# Patient Record
Sex: FEMALE | Race: WHITE | NOT HISPANIC OR LATINO | Employment: FULL TIME | URBAN - METROPOLITAN AREA
[De-identification: names, ages, dates, MRNs, and addresses within clinical notes are randomized per-mention and may not be internally consistent; named-entity substitution may affect disease eponyms.]

---

## 2017-01-12 DIAGNOSIS — M54.2 NECK PAIN: ICD-10-CM

## 2017-01-12 DIAGNOSIS — M50.20 PROTRUDED CERVICAL DISC: Primary | ICD-10-CM

## 2017-01-12 NOTE — TELEPHONE ENCOUNTER
Pt got TENS unit at [facility] yesterday.  They told her she will need to replace pads periodically and needed a Rx for this.  T'd up, please print and fax over.  Thank you.

## 2017-03-09 ENCOUNTER — TRANSFERRED RECORDS (OUTPATIENT)
Dept: HEALTH INFORMATION MANAGEMENT | Facility: CLINIC | Age: 37
End: 2017-03-09

## 2017-04-17 ENCOUNTER — TELEPHONE (OUTPATIENT)
Dept: INTERNAL MEDICINE | Facility: CLINIC | Age: 37
End: 2017-04-17

## 2017-04-17 NOTE — TELEPHONE ENCOUNTER
Panel Management Review      Patient has the following on her problem list:     Depression / Dysthymia review  PHQ-9 SCORE 2/18/2015 4/15/2015 12/28/2016   Total Score 9 3 -   Total Score - - 23      Patient is due for:  Physical, ACT    Asthma review     ACT Total Scores 2/18/2015   ACT TOTAL SCORE 23   ASTHMA ER VISITS 0 = None   ASTHMA HOSPITALIZATIONS 0 = None      1. Is Asthma diagnosis on the Problem List? Yes    2. Is Asthma listed on Health Maintenance? Yes    3. Patient is due for:  ACT and physical      Composite cancer screening  Chart review shows that this patient is due/due soon for the following Pap Smear and ACT  Summary:    Patient is due/failing the following:   ACT, PAP and PHYSICAL    Action needed:   Patient needs office visit for physical.    Type of outreach:    Phone, left message for patient to call back.     Questions for provider review:    None                                                                                                                                    Nancie Herrera MA       Chart routed to Care Team .

## 2017-09-12 ENCOUNTER — TELEPHONE (OUTPATIENT)
Dept: INTERNAL MEDICINE | Facility: CLINIC | Age: 37
End: 2017-09-12

## 2017-09-12 NOTE — TELEPHONE ENCOUNTER
Panel Management Review      Patient has the following on her problem list:     Depression / Dysthymia review  PHQ-9 SCORE 2/18/2015 4/15/2015 12/28/2016   Total Score 9 3 -   Total Score - - 23      Patient is due for:  PHQ9 and DAP    Asthma review     ACT Total Scores 2/18/2015   ACT TOTAL SCORE 23   ASTHMA ER VISITS 0 = None   ASTHMA HOSPITALIZATIONS 0 = None      1. Is Asthma diagnosis on the Problem List? Yes    2. Is Asthma listed on Health Maintenance? Yes    3. Patient is due for:  ACT and AAP          Composite cancer screening  Chart review shows that this patient is due/due soon for the following Pap Smear  Summary:    Patient is due/failing the following:   AAP, ACT, DAP, PAP and PHQ9    Action needed:   Patient needs office visit for asthma/depression.    Type of outreach:    Sent [employer] message.    Questions for provider review:    None                                                                                                                                    Gissel Patel CMA       Chart routed to no one .

## 2017-11-19 ENCOUNTER — HEALTH MAINTENANCE LETTER (OUTPATIENT)
Age: 37
End: 2017-11-19

## 2017-11-29 ENCOUNTER — TELEPHONE (OUTPATIENT)
Dept: INTERNAL MEDICINE | Facility: CLINIC | Age: 37
End: 2017-11-29

## 2017-11-29 NOTE — TELEPHONE ENCOUNTER
"11/29/2017      Patient Communication Preferences indicate  Do not contact  and/or communication by \"Phone\" is not preferred. Call not required per Outreach team.        Outreach ,  Molly Mccoy        "

## 2018-01-12 ENCOUNTER — TELEPHONE (OUTPATIENT)
Dept: INTERNAL MEDICINE | Facility: CLINIC | Age: 38
End: 2018-01-12

## 2018-01-12 NOTE — TELEPHONE ENCOUNTER
Form received from: [employer]    Form requesting following info/need: TENS supply orders    CARMELINA needed?: No    Location of form: Dr. Magana's in basket    When completed the route for return: Fax

## 2019-06-27 ENCOUNTER — APPOINTMENT (OUTPATIENT)
Dept: GENERAL RADIOLOGY | Facility: CLINIC | Age: 39
End: 2019-06-27
Attending: EMERGENCY MEDICINE
Payer: COMMERCIAL

## 2019-06-27 ENCOUNTER — HOSPITAL ENCOUNTER (EMERGENCY)
Facility: CLINIC | Age: 39
Discharge: HOME | End: 2019-06-27
Attending: EMERGENCY MEDICINE | Admitting: EMERGENCY MEDICINE
Payer: COMMERCIAL

## 2019-06-27 VITALS
DIASTOLIC BLOOD PRESSURE: 84 MMHG | SYSTOLIC BLOOD PRESSURE: 145 MMHG | OXYGEN SATURATION: 98 % | HEART RATE: 91 BPM | RESPIRATION RATE: 16 BRPM | TEMPERATURE: 98 F | WEIGHT: 230 LBS | BODY MASS INDEX: 42.07 KG/M2

## 2019-06-27 DIAGNOSIS — J40 BRONCHITIS: ICD-10-CM

## 2019-06-27 DIAGNOSIS — R11.2 NON-INTRACTABLE VOMITING WITH NAUSEA, UNSPECIFIED VOMITING TYPE: ICD-10-CM

## 2019-06-27 LAB
DEPRECATED S PYO AG THROAT QL EIA: NORMAL
SPECIMEN SOURCE: NORMAL

## 2019-06-27 PROCEDURE — 25000125 ZZHC RX 250: Performed by: EMERGENCY MEDICINE

## 2019-06-27 PROCEDURE — 87880 STREP A ASSAY W/OPTIC: CPT | Performed by: EMERGENCY MEDICINE

## 2019-06-27 PROCEDURE — 25000131 ZZH RX MED GY IP 250 OP 636 PS 637: Performed by: EMERGENCY MEDICINE

## 2019-06-27 PROCEDURE — 71046 X-RAY EXAM CHEST 2 VIEWS: CPT

## 2019-06-27 PROCEDURE — 96372 THER/PROPH/DIAG INJ SC/IM: CPT

## 2019-06-27 PROCEDURE — 25000128 H RX IP 250 OP 636: Performed by: EMERGENCY MEDICINE

## 2019-06-27 PROCEDURE — 94640 AIRWAY INHALATION TREATMENT: CPT

## 2019-06-27 PROCEDURE — 87081 CULTURE SCREEN ONLY: CPT | Performed by: EMERGENCY MEDICINE

## 2019-06-27 PROCEDURE — 99284 EMERGENCY DEPT VISIT MOD MDM: CPT | Mod: 25

## 2019-06-27 RX ORDER — ALBUTEROL SULFATE 90 UG/1
2 AEROSOL, METERED RESPIRATORY (INHALATION) EVERY 6 HOURS PRN
Qty: 1 INHALER | Refills: 0 | Status: SHIPPED | OUTPATIENT
Start: 2019-06-27 | End: 2020-01-10

## 2019-06-27 RX ORDER — ONDANSETRON 4 MG/1
4 TABLET, ORALLY DISINTEGRATING ORAL EVERY 8 HOURS PRN
Qty: 10 TABLET | Refills: 0 | Status: SHIPPED | OUTPATIENT
Start: 2019-06-27 | End: 2020-01-10

## 2019-06-27 RX ORDER — IPRATROPIUM BROMIDE AND ALBUTEROL SULFATE 2.5; .5 MG/3ML; MG/3ML
3 SOLUTION RESPIRATORY (INHALATION) ONCE
Status: COMPLETED | OUTPATIENT
Start: 2019-06-27 | End: 2019-06-27

## 2019-06-27 RX ORDER — ONDANSETRON 4 MG/1
4 TABLET, ORALLY DISINTEGRATING ORAL ONCE
Status: COMPLETED | OUTPATIENT
Start: 2019-06-27 | End: 2019-06-27

## 2019-06-27 RX ORDER — IPRATROPIUM BROMIDE AND ALBUTEROL SULFATE 2.5; .5 MG/3ML; MG/3ML
1 SOLUTION RESPIRATORY (INHALATION) EVERY 4 HOURS PRN
Qty: 1 BOX | Refills: 1 | Status: SHIPPED | OUTPATIENT
Start: 2019-06-27 | End: 2020-01-10

## 2019-06-27 RX ORDER — PREDNISONE 50 MG/1
TABLET ORAL
Qty: 4 TABLET | Refills: 0 | Status: SHIPPED | OUTPATIENT
Start: 2019-06-27 | End: 2020-01-10

## 2019-06-27 RX ORDER — KETOROLAC TROMETHAMINE 15 MG/ML
15 INJECTION, SOLUTION INTRAMUSCULAR; INTRAVENOUS ONCE
Status: COMPLETED | OUTPATIENT
Start: 2019-06-27 | End: 2019-06-27

## 2019-06-27 RX ADMIN — IPRATROPIUM BROMIDE AND ALBUTEROL SULFATE 3 ML: .5; 3 SOLUTION RESPIRATORY (INHALATION) at 05:57

## 2019-06-27 RX ADMIN — ONDANSETRON 4 MG: 4 TABLET, ORALLY DISINTEGRATING ORAL at 07:04

## 2019-06-27 RX ADMIN — KETOROLAC TROMETHAMINE 15 MG: 15 INJECTION, SOLUTION INTRAMUSCULAR; INTRAVENOUS at 07:06

## 2019-06-27 RX ADMIN — IPRATROPIUM BROMIDE AND ALBUTEROL SULFATE 3 ML: .5; 3 SOLUTION RESPIRATORY (INHALATION) at 07:04

## 2019-06-27 ASSESSMENT — ENCOUNTER SYMPTOMS
FEVER: 0
EYE DISCHARGE: 0
EYE REDNESS: 0
EYE ITCHING: 1
CHEST TIGHTNESS: 1
SORE THROAT: 1
VOMITING: 1
BACK PAIN: 1
COUGH: 1

## 2019-06-27 NOTE — DISCHARGE INSTRUCTIONS
Steroids for bronchitis which is likely making your underlying asthma flare.  Nebulizers or your inhaler every 4-6 hours as needed for cough, chest tightness, or shortness of breath.  Zofran as needed for nausea and vomiting.  Tylenol or ibuprofen as needed for pain.  Follow-up with your primary care provider on Monday for reevaluation.  In the meantime, return to the emergency department if you have severe chest pain or shortness of breath or if you develop any new or concerning symptoms including, but not limited to, fever greater than 101  F.

## 2019-06-27 NOTE — ED TRIAGE NOTES
Productive cough for last 2 days, much worse this AM.  Productive of yellow sputum.  Mild nausea as well.

## 2019-06-27 NOTE — ED PROVIDER NOTES
History     Chief Complaint:  Cough    The history is provided by the patient.      Lidya Montelongo is a 38 year old female who does not smoke, with a history of asthma, who presents for evaluation of a cough. Four days ago, she started coughing which she initially attributed to her asthma and allergies despite lack of sneezing or watery eyes. She has been using Sudafed, her usual daily Zyrtec, and nebulizers. Despite these interventions, her cough has progressed and is productive of yellow sputum. Last night her cough prohibited her from sleeping. She has associated chest tightness that wraps around to her back and she notes that she has had similar back pain in the past when she was diagnosed with strep throat. She has been taking Tylenol and ibuprofen for her symptoms, last dose 3 hours prior to arrival. She has had post-tussive emesis, congestion, itchy eyes, and bilateral otalgia - right worse than left. She has a slight sore throat, but feels this is likely secondary to cough. She is unsure if she has a fever or not.     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    Allergies:  Latex  Amoxicillin  Magnesium     Medications:    Albuterol inhaler/nebulizer  Cetirizine  Celexa  Propranolol  Ranitidine  Ropinirole    Past Medical History:    Asthma  Migraines  RLS  Depression  ADHD    Past Surgical History:     x2  Tubal ligation    Family History:    HTN  Diabetes    Social History:  Marital Status:     Presents with her mother and her children.  Negative for tobacco use.  Negative for alcohol use.   .    Review of Systems   Constitutional: Negative for fever.   HENT: Positive for congestion, ear pain and sore throat. Negative for sneezing.    Eyes: Positive for itching. Negative for discharge and redness.   Respiratory: Positive for cough and chest tightness.    Gastrointestinal: Positive for vomiting.   Musculoskeletal: Positive for back pain.    Psychiatric/Behavioral: Positive for sleep disturbance.   All other systems reviewed and are negative.    Physical Exam     Patient Vitals for the past 24 hrs:   BP Temp Temp src Pulse Resp SpO2 Weight   06/27/19 0807 145/84 98  F (36.7  C) Oral 91 16 98 % --   06/27/19 0538 (!) 162/111 -- -- -- -- -- --   06/27/19 0537 -- 98.1  F (36.7  C) Temporal 95 22 95 % 104.3 kg (230 lb)      Physical Exam  General: Well-developed and well-nourished. Well appearing young  woman. Cooperative.  Head:  Atraumatic.  Eyes:  Conjunctivae, lids, and sclerae are normal.  ENT:    Normal nose. Moist mucous membranes.  Normal TMs bilaterally.  Clear posterior oropharynx.  Neck:  Supple. Normal range of motion.  No significant lymphadenopathy.  CV:  Regular rate and rhythm. Normal heart sounds with no murmurs, rubs, or gallops detected.  Resp:  No respiratory distress. Clear to auscultation bilaterally without decreased breath sounds, wheezing, rales, or rhonchi. Frequent cough.  GI:  Soft. Non-distended. Non-tender.    MS:  Normal ROM.   Skin:  Warm. Non-diaphoretic. No pallor.  Neuro:  Awake. A&Ox3. Normal strength.  Psych: Normal mood and affect. Normal speech.  Vitals reviewed.    Emergency Department Course   Imaging:  Radiographic findings were communicated with the patient who voiced understanding of the findings.  XR Chest PA & LAT:   No evidence of active cardiopulmonary disease, as per radiology.     Laboratory:  Rapid strep: Negative  Beta strep group A culture: Pending    Interventions:  0557 Albuterol-Ipratropium 2.5mg-0.5mg/3ml, 3 mL solution, Nebulizer   0704 Albuterol-Ipratropium 2.5mg-0.5mg/3ml, 3 mL solution, Nebulizer     Zofran-ODT 4mg disintegrating tablet PO  0706 Toradol, 15 mg, IM injection    Emergency Department Course:  Nursing notes and vitals reviewed. Dr. Shen started a preliminary work up on the patient in the emergency department.      Medicine administered as documented above.      The  patient was sent for a chest x-ray while in the emergency department, findings above.      (0650) I performed an exam of the patient as documented above. She reports the first DuoNeb improved her symptoms.     Throat swabbed and sample was sent to the laboratory for testing, findings above.     (0741) I rechecked the patient and discussed the results of her workup thus far. Says nebs are helping her breathing and the Toradol helped her pains. She states she has been using home nebulizer treatments, but she has run out of them.      (0810) The patient was able to tolerate a PO challenge in the emergency department without return of symptoms.     (0813) I rechecked the patient and provided discharge instructions. At this point, she checked her albuterol inhaler and noted it to be .     Findings and plan explained to the patient. Patient discharged home with instructions regarding supportive care, medications, and reasons to return. The importance of close follow-up was reviewed. The patient was prescribed a DuoNeb solution, a short course of prednisone, and Zofran ODT tablets. I also refilled her albuterol inhaler.      I personally reviewed the laboratory and imaging results with the patient and answered all related questions prior to discharge.     Impression & Plan      Medical Decision Making:  Lidya is a 38-year-old female who has had 4 days of worsening cough, productive of yellow sputum, associated with some chest tightness and post-tussive vomiting.  She does have some pain radiating towards her back which she states is similar to when she has had strep throat in the past.  She denies junior fever. Fortunately, Lidya appears quite well on exam.  She is coughing frequently and does produce yellow sputum, though her lungs are clear bilaterally with no wheezing.  Despite otalgia and concerns for sore throat, these areas are normal on exam.  Patient was given a DuoNeb and had improvement and then a second  DuoNeb and had further improvement with decreased frequency of cough.  I did obtain a rapid strep which is negative as expected as she has no significant sore throat or fever.  Chest x-ray was obtained which reveals no pneumonia or other acute pathology to explain her symptoms.  She was given IM Toradol with improvement in her pain and Zofran with resolution of her nausea and vomiting such that she could tolerate PO.  Symptoms are most consistent with bronchitis likely causing some exacerbation of her underlying asthma though, again, she had no wheezing.  At this time, there is no evidence of bacterial infection and antibiotics are not indicated.  I will give her a short burst of steroids for asthma exacerbation/bronchitis and I have refilled her home nebulizer as she was out and her inhaler which she discovered while in the emergency department is .  She will continue Zofran as needed for posttussive vomiting.  She will also continue Tylenol or ibuprofen for her aches and pains and she agrees to follow-up with her primary care provider in the next few days for repeat evaluation.  At this time, further work-up or treatment is not indicated in the emergency department.  Labs are unlikely to .  I highly doubt pulmonary embolus as the etiology of cough and chest tightness in this young otherwise healthy woman, particularly with infectious symptoms.  However, patient understands she should return to the emergency department should she have worsening of her current symptoms or development of new concerns.  All of the patient and her family's questions were answered and they verbalized understanding.  Amenable to discharge.    Diagnosis:    ICD-10-CM    1. Bronchitis J40    2. Non-intractable vomiting with nausea, unspecified vomiting type R11.2        Disposition:  discharged home    Discharge Medications:     Medication List      Started    ipratropium - albuterol 0.5 mg/2.5 mg/3 mL 0.5-2.5 (3)  MG/3ML neb solution  Commonly known as:  DUONEB  1 vial, Nebulization, EVERY 4 HOURS PRN     ondansetron 4 MG ODT tab  Commonly known as:  ZOFRAN ODT  4 mg, Oral, EVERY 8 HOURS PRN     predniSONE 50 MG tablet  Commonly known as:  DELTASONE  Take 1 tablet by mouth daily for 4 days.     * albuterol 108 (90 Base) MCG/ACT inhaler  Commonly known as:  PROAIR HFA/PROVENTIL HFA/VENTOLIN HFA  2 puffs, Inhalation, EVERY 6 HOURS PRN  What changed:  You were already taking a medication with the same name, and this prescription was added. Make sure you understand how and when to take each.          Scribe Disclosure:  I, Cristina Perez, am serving as a scribe on 6/27/2019 at 6:50 AM to personally document services performed by Claudia Pastor MD based on my observations and the provider's statements to me.        Cristina Perez  6/27/2019   [facility] EMERGENCY DEPARTMENT       Claudia Pastor MD  06/30/19 2005

## 2019-06-27 NOTE — ED AVS SNAPSHOT
[facility] Emergency Department  [address]  [facility] [address]  Phone:  [phone]  Fax:  [phone]                                    Lidya Montelongo   MRN: [id number]    Department:  [facility] Emergency Department   Date of Visit:  6/27/2019           After Visit Summary Signature Page    I have received my discharge instructions, and my questions have been answered. I have discussed any challenges I see with this plan with the nurse or doctor.    ..........................................................................................................................................  Patient/Patient Representative Signature      ..........................................................................................................................................  Patient Representative Print Name and Relationship to Patient    ..................................................               ................................................  Date                                   Time    ..........................................................................................................................................  Reviewed by Signature/Title    ...................................................              ..............................................  Date                                               Time          22EPIC Rev 08/18

## 2019-06-29 LAB
BACTERIA SPEC CULT: NORMAL
Lab: NORMAL
SPECIMEN SOURCE: NORMAL

## 2019-06-30 ASSESSMENT — ENCOUNTER SYMPTOMS: SLEEP DISTURBANCE: 1

## 2020-01-10 ENCOUNTER — OFFICE VISIT (OUTPATIENT)
Dept: INTERNAL MEDICINE | Facility: CLINIC | Age: 40
End: 2020-01-10
Payer: COMMERCIAL

## 2020-01-10 ENCOUNTER — TELEPHONE (OUTPATIENT)
Dept: INTERNAL MEDICINE | Facility: CLINIC | Age: 40
End: 2020-01-10

## 2020-01-10 VITALS
OXYGEN SATURATION: 99 % | HEIGHT: 62 IN | DIASTOLIC BLOOD PRESSURE: 90 MMHG | RESPIRATION RATE: 16 BRPM | WEIGHT: 243 LBS | HEART RATE: 93 BPM | TEMPERATURE: 97.8 F | SYSTOLIC BLOOD PRESSURE: 140 MMHG | BODY MASS INDEX: 44.72 KG/M2

## 2020-01-10 DIAGNOSIS — G25.81 RESTLESS LEG SYNDROME: ICD-10-CM

## 2020-01-10 DIAGNOSIS — J31.0 CHRONIC RHINITIS: ICD-10-CM

## 2020-01-10 DIAGNOSIS — E66.01 MORBID OBESITY (H): ICD-10-CM

## 2020-01-10 DIAGNOSIS — M62.838 NECK MUSCLE SPASM: ICD-10-CM

## 2020-01-10 DIAGNOSIS — F41.8 DEPRESSION WITH ANXIETY: ICD-10-CM

## 2020-01-10 DIAGNOSIS — B37.2 YEAST INFECTION OF THE SKIN: ICD-10-CM

## 2020-01-10 DIAGNOSIS — M54.2 CERVICALGIA: ICD-10-CM

## 2020-01-10 DIAGNOSIS — F32.0 MILD MAJOR DEPRESSION (H): Primary | ICD-10-CM

## 2020-01-10 DIAGNOSIS — J45.20 MILD INTERMITTENT ASTHMA WITHOUT COMPLICATION: ICD-10-CM

## 2020-01-10 DIAGNOSIS — Z00.00 ROUTINE GENERAL MEDICAL EXAMINATION AT A HEALTH CARE FACILITY: ICD-10-CM

## 2020-01-10 DIAGNOSIS — G43.109 MIGRAINE WITH AURA AND WITHOUT STATUS MIGRAINOSUS, NOT INTRACTABLE: ICD-10-CM

## 2020-01-10 LAB
BASOPHILS # BLD AUTO: 0 10E9/L (ref 0–0.2)
BASOPHILS NFR BLD AUTO: 0.4 %
DIFFERENTIAL METHOD BLD: NORMAL
EOSINOPHIL # BLD AUTO: 0.2 10E9/L (ref 0–0.7)
EOSINOPHIL NFR BLD AUTO: 2.7 %
ERYTHROCYTE [DISTWIDTH] IN BLOOD BY AUTOMATED COUNT: 12.8 % (ref 10–15)
HCT VFR BLD AUTO: 39.7 % (ref 35–47)
HGB BLD-MCNC: 13 G/DL (ref 11.7–15.7)
IMM GRANULOCYTES # BLD: 0 10E9/L (ref 0–0.4)
IMM GRANULOCYTES NFR BLD: 0.3 %
LYMPHOCYTES # BLD AUTO: 2 10E9/L (ref 0.8–5.3)
LYMPHOCYTES NFR BLD AUTO: 29.2 %
MCH RBC QN AUTO: 28.5 PG (ref 26.5–33)
MCHC RBC AUTO-ENTMCNC: 32.7 G/DL (ref 31.5–36.5)
MCV RBC AUTO: 87 FL (ref 78–100)
MONOCYTES # BLD AUTO: 0.4 10E9/L (ref 0–1.3)
MONOCYTES NFR BLD AUTO: 5.8 %
NEUTROPHILS # BLD AUTO: 4.1 10E9/L (ref 1.6–8.3)
NEUTROPHILS NFR BLD AUTO: 61.6 %
NRBC # BLD AUTO: 0 10*3/UL
NRBC BLD AUTO-RTO: 0 /100
PLATELET # BLD AUTO: 323 10E9/L (ref 150–450)
RBC # BLD AUTO: 4.56 10E12/L (ref 3.8–5.2)
WBC # BLD AUTO: 6.7 10E9/L (ref 4–11)

## 2020-01-10 PROCEDURE — 80061 LIPID PANEL: CPT | Performed by: INTERNAL MEDICINE

## 2020-01-10 PROCEDURE — 96127 BRIEF EMOTIONAL/BEHAV ASSMT: CPT | Performed by: INTERNAL MEDICINE

## 2020-01-10 PROCEDURE — 84439 ASSAY OF FREE THYROXINE: CPT | Performed by: INTERNAL MEDICINE

## 2020-01-10 PROCEDURE — 80050 GENERAL HEALTH PANEL: CPT | Performed by: INTERNAL MEDICINE

## 2020-01-10 PROCEDURE — 99203 OFFICE O/P NEW LOW 30 MIN: CPT | Performed by: INTERNAL MEDICINE

## 2020-01-10 PROCEDURE — 36415 COLL VENOUS BLD VENIPUNCTURE: CPT | Performed by: INTERNAL MEDICINE

## 2020-01-10 PROCEDURE — 82306 VITAMIN D 25 HYDROXY: CPT | Performed by: INTERNAL MEDICINE

## 2020-01-10 RX ORDER — SUMATRIPTAN 100 MG/1
100 TABLET, FILM COATED ORAL
Qty: 9 TABLET | Refills: 7 | Status: SHIPPED | OUTPATIENT
Start: 2020-01-10 | End: 2021-10-12

## 2020-01-10 RX ORDER — IPRATROPIUM BROMIDE AND ALBUTEROL SULFATE 2.5; .5 MG/3ML; MG/3ML
1 SOLUTION RESPIRATORY (INHALATION) EVERY 4 HOURS PRN
Qty: 1 BOX | Refills: 1 | Status: SHIPPED | OUTPATIENT
Start: 2020-01-10 | End: 2021-10-12

## 2020-01-10 RX ORDER — METHOCARBAMOL 500 MG/1
1000 TABLET, FILM COATED ORAL 3 TIMES DAILY PRN
Qty: 90 TABLET | Refills: 5 | Status: SHIPPED | OUTPATIENT
Start: 2020-01-10 | End: 2021-04-27

## 2020-01-10 RX ORDER — HYDROCODONE BITARTRATE AND ACETAMINOPHEN 5; 325 MG/1; MG/1
1 TABLET ORAL EVERY 6 HOURS PRN
Qty: 20 TABLET | Refills: 0 | Status: SHIPPED | OUTPATIENT
Start: 2020-01-10 | End: 2021-02-26

## 2020-01-10 RX ORDER — ONDANSETRON 4 MG/1
4 TABLET, ORALLY DISINTEGRATING ORAL EVERY 8 HOURS PRN
Qty: 10 TABLET | Refills: 0 | Status: SHIPPED | OUTPATIENT
Start: 2020-01-10 | End: 2021-10-12

## 2020-01-10 RX ORDER — FLUCONAZOLE 150 MG/1
150 TABLET ORAL DAILY
Qty: 3 TABLET | Refills: 0 | Status: SHIPPED | OUTPATIENT
Start: 2020-01-10 | End: 2020-01-13

## 2020-01-10 RX ORDER — CITALOPRAM HYDROBROMIDE 20 MG/1
TABLET ORAL
Qty: 90 TABLET | Refills: 1 | Status: SHIPPED | OUTPATIENT
Start: 2020-01-10 | End: 2020-07-21

## 2020-01-10 RX ORDER — ROPINIROLE 1 MG/1
1 TABLET, FILM COATED ORAL AT BEDTIME
Qty: 90 TABLET | Refills: 3 | Status: SHIPPED | OUTPATIENT
Start: 2020-01-10 | End: 2021-01-28

## 2020-01-10 RX ORDER — ALBUTEROL SULFATE 90 UG/1
2 AEROSOL, METERED RESPIRATORY (INHALATION) EVERY 6 HOURS PRN
Qty: 1 INHALER | Refills: 0 | Status: SHIPPED | OUTPATIENT
Start: 2020-01-10 | End: 2021-10-12

## 2020-01-10 RX ORDER — CETIRIZINE HYDROCHLORIDE 10 MG/1
10 TABLET ORAL 2 TIMES DAILY
Qty: 60 TABLET | Refills: 5 | Status: SHIPPED | OUTPATIENT
Start: 2020-01-10 | End: 2021-10-12

## 2020-01-10 ASSESSMENT — ANXIETY QUESTIONNAIRES
GAD7 TOTAL SCORE: 13
2. NOT BEING ABLE TO STOP OR CONTROL WORRYING: MORE THAN HALF THE DAYS
1. FEELING NERVOUS, ANXIOUS, OR ON EDGE: MORE THAN HALF THE DAYS
7. FEELING AFRAID AS IF SOMETHING AWFUL MIGHT HAPPEN: NOT AT ALL
IF YOU CHECKED OFF ANY PROBLEMS ON THIS QUESTIONNAIRE, HOW DIFFICULT HAVE THESE PROBLEMS MADE IT FOR YOU TO DO YOUR WORK, TAKE CARE OF THINGS AT HOME, OR GET ALONG WITH OTHER PEOPLE: SOMEWHAT DIFFICULT
6. BECOMING EASILY ANNOYED OR IRRITABLE: NEARLY EVERY DAY
5. BEING SO RESTLESS THAT IT IS HARD TO SIT STILL: MORE THAN HALF THE DAYS
3. WORRYING TOO MUCH ABOUT DIFFERENT THINGS: MORE THAN HALF THE DAYS

## 2020-01-10 ASSESSMENT — MIFFLIN-ST. JEOR: SCORE: 1740.49

## 2020-01-10 ASSESSMENT — PATIENT HEALTH QUESTIONNAIRE - PHQ9
5. POOR APPETITE OR OVEREATING: MORE THAN HALF THE DAYS
SUM OF ALL RESPONSES TO PHQ QUESTIONS 1-9: 10

## 2020-01-10 NOTE — NURSING NOTE
"BP (!) 140/90   Pulse 93   Temp 97.8  F (36.6  C) (Oral)   Resp 16   Ht 1.575 m (5' 2\")   Wt 110.2 kg (243 lb)   LMP 01/03/2020   SpO2 99%   Breastfeeding No   BMI 44.45 kg/m      "

## 2020-01-10 NOTE — PROGRESS NOTES
Subjective     Lidya Montelongo is a 39 year old female who presents to clinic today for the following health issues:    HPI   Depression and Anxiety Follow-Up    How are you doing with your depression since your last visit? Hasn't been on citalopram since thankgiving    How are you doing with your anxiety since your last visit?  Worsened     Are you having other symptoms that might be associated with depression or anxiety? No    Have you had a significant life event? Relationship Concerns     Do you have any concerns with your use of alcohol or other drugs? No     Patient has been off of celexa since November.    Social History     Tobacco Use     Smoking status: Never Smoker     Smokeless tobacco: Never Used   Substance Use Topics     Alcohol use: No     Drug use: No     PHQ 12/28/2016   PHQ-9 Total Score 23   Q9: Thoughts of better off dead/self-harm past 2 weeks Several days     RIGO-7 SCORE 4/15/2015 12/28/2016   Total Score 7 -   Total Score - 20     She is seeing psychiatry, and has increased the celexa to 40mg.      Suicide Assessment Five-step Evaluation and Treatment (SAFE-T)    Asthma Follow-Up    Was ACT completed today?    Yes    ACT Total Scores 2/18/2015   ACT TOTAL SCORE 23   ASTHMA ER VISITS 0 = None   ASTHMA HOSPITALIZATIONS 0 = None       How many days per week do you miss taking your asthma controller medication?  I do not have an asthma controller medication    Please describe any recent triggers for your asthma: seasonal and colds    Have you had any Emergency Room Visits, Urgent Care Visits, or Hospital Admissions since your last office visit? This past summer went to ER with bronchitis and flare of asthma    Cervicalgia. She has had an MRI of cervical spine.  Has tried Physical therapy. Tried TENS unit.  She has pain from neck and radiates down right arm.   She has seen neurology.    2/2015  IMPRESSION: Minimal annular bulge C5-6. Scan otherwise negative.      Reviewed and updated as needed this  "visit by Provider         Review of Systems   ROS COMP: CONSTITUTIONAL: NEGATIVE for fever, chills, change in weight  RESP: NEGATIVE for significant cough or SOB  CV: NEGATIVE for chest pain, palpitations or peripheral edema  MSK: cervicalgia      Objective    BP (!) 140/90   Pulse 93   Temp 97.8  F (36.6  C) (Oral)   Resp 16   Ht 1.575 m (5' 2\")   Wt 110.2 kg (243 lb)   LMP 01/03/2020   SpO2 99%   Breastfeeding No   BMI 44.45 kg/m    Body mass index is 44.45 kg/m .  Physical Exam   GENERAL: healthy, alert and no distress  NECK: no adenopathy, no asymmetry, masses, or scars and thyroid normal to palpation  RESP: lungs clear to auscultation - no rales, rhonchi or wheezes  CV: regular rate and rhythm, normal S1 S2, no S3 or S4, no murmur, click or rub, no peripheral edema and peripheral pulses strong  ABDOMEN: soft, nontender, no hepatosplenomegaly, no masses and bowel sounds normal  MS: no gross musculoskeletal defects noted, no edema    Diagnostic Test Results:  Labs reviewed in Epic        Assessment & Plan       (F32.0) Mild major depression (H)  (primary encounter diagnosis)  Comment: not at goal  Plan: restart celexa    (E66.01) Morbid obesity (H)  Comment:  Plan: BARIATRIC ADULT REFERRAL            (J45.20) Intermittent asthma  Comment: at goal  Plan: continue on current regimen    (J31.0) Chronic rhinitis  Comment: needs refill  Plan: cetirizine (ZYRTEC) 10 MG tablet            (G43.509) Migraine aura, persistent  Comment:   Plan:     (M62.838) Neck muscle spasm  Comment: needs refill  Plan: methocarbamol (ROBAXIN) 500 MG tablet            (G25.81) Restless leg syndrome  Comment:   Plan: rOPINIRole (REQUIP) 1 MG tablet            (J45.20) Mild intermittent asthma without complication  Comment:   Plan: ipratropium - albuterol 0.5 mg/2.5 mg/3 mL         (DUONEB) 0.5-2.5 (3) MG/3ML neb solution,         albuterol (PROAIR HFA/PROVENTIL HFA/VENTOLIN         HFA) 108 (90 Base) MCG/ACT inhaler        " "    (G43.109) Migraine with aura and without status migrainosus, not intractable  Comment:   Plan: HYDROcodone-acetaminophen (NORCO) 5-325 MG         tablet, ondansetron (ZOFRAN ODT) 4 MG ODT tab,         SUMAtriptan (IMITREX) 100 MG tablet            (F41.8) Depression with anxiety  Comment: restart  Plan: citalopram (CELEXA) 20 MG tablet            (M54.2) Cervicalgia  Comment:   Plan: ORTHO  REFERRAL            (B37.2) Yeast infection of the skin  Comment:   Plan: fluconazole (DIFLUCAN) 150 MG tablet            (Z00.00) Routine general medical examination at a health care facility  Comment: assess  Plan: Comprehensive metabolic panel, Lipid panel         reflex to direct LDL Fasting, CBC with         platelets differential, TSH with free T4         reflex, Vitamin D Deficiency          -f/u for a physical in 1-3 months    Wants celexa, does not want to try cymbalta since she has had side effects from meds  Blood pressure up, but she is in pain right now.  She is seeing spine specialist for neck pain.    BMI:   Estimated body mass index is 44.45 kg/m  as calculated from the following:    Height as of this encounter: 1.575 m (5' 2\").    Weight as of this encounter: 110.2 kg (243 lb).           See Patient Instructions    No follow-ups on file.    Lisandra Magana MD  [facility]    "

## 2020-01-10 NOTE — TELEPHONE ENCOUNTER
Prior Authorization Retail Medication Request    Medication/Dose: Cetirizine  ICD code (if different than what is on RX):  unknown  Previously Tried and Failed:  unknown  Rationale:  unknown    Insurance Name:  [facility]  Insurance ID:  [id number]      Pharmacy Information (if different than what is on RX)  Name:  [facility]  Phone:  [phone]

## 2020-01-11 LAB
ALBUMIN SERPL-MCNC: 3.9 G/DL (ref 3.4–5)
ALP SERPL-CCNC: 48 U/L (ref 40–150)
ALT SERPL W P-5'-P-CCNC: 26 U/L (ref 0–50)
ANION GAP SERPL CALCULATED.3IONS-SCNC: 5 MMOL/L (ref 3–14)
AST SERPL W P-5'-P-CCNC: 11 U/L (ref 0–45)
BILIRUB SERPL-MCNC: 0.3 MG/DL (ref 0.2–1.3)
BUN SERPL-MCNC: 10 MG/DL (ref 7–30)
CALCIUM SERPL-MCNC: 8.6 MG/DL (ref 8.5–10.1)
CHLORIDE SERPL-SCNC: 110 MMOL/L (ref 94–109)
CHOLEST SERPL-MCNC: 211 MG/DL
CO2 SERPL-SCNC: 26 MMOL/L (ref 20–32)
CREAT SERPL-MCNC: 0.64 MG/DL (ref 0.52–1.04)
GFR SERPL CREATININE-BSD FRML MDRD: >90 ML/MIN/{1.73_M2}
GLUCOSE SERPL-MCNC: 94 MG/DL (ref 70–99)
HDLC SERPL-MCNC: 41 MG/DL
LDLC SERPL CALC-MCNC: 120 MG/DL
NONHDLC SERPL-MCNC: 170 MG/DL
POTASSIUM SERPL-SCNC: 4.2 MMOL/L (ref 3.4–5.3)
PROT SERPL-MCNC: 7.4 G/DL (ref 6.8–8.8)
SODIUM SERPL-SCNC: 141 MMOL/L (ref 133–144)
T4 FREE SERPL-MCNC: 0.88 NG/DL (ref 0.76–1.46)
TRIGL SERPL-MCNC: 249 MG/DL
TSH SERPL DL<=0.005 MIU/L-ACNC: 5.38 MU/L (ref 0.4–4)

## 2020-01-11 ASSESSMENT — ANXIETY QUESTIONNAIRES: GAD7 TOTAL SCORE: 13

## 2020-01-11 ASSESSMENT — ASTHMA QUESTIONNAIRES: ACT_TOTALSCORE: 24

## 2020-01-13 LAB — DEPRECATED CALCIDIOL+CALCIFEROL SERPL-MC: 7 UG/L (ref 20–75)

## 2020-01-13 NOTE — TELEPHONE ENCOUNTER
PRIOR AUTHORIZATION DENIED    Medication: Cetirizine- DENIED    Denial Date: 1/13/2020    Denial Rational: Patient is able to get up to 1 tablet per day of this drug, but plan does not allow more than one tablet per day since it is available OTC.        Appeal Information: If provider would like to appeal please provide a letter of medical necessity.

## 2020-01-13 NOTE — TELEPHONE ENCOUNTER
Central Prior Authorization Team  Phone: [phone]    PA Initiation    Medication: Cetirizine  Insurance Company: [employer] - Phone [phone] Fax [phone]  Pharmacy Filling the Rx: [facility] [address] [place] [address] - [address]  Filling Pharmacy Phone: [phone]  Filling Pharmacy Fax:    Start Date: 1/13/2020

## 2020-03-02 ENCOUNTER — HEALTH MAINTENANCE LETTER (OUTPATIENT)
Age: 40
End: 2020-03-02

## 2020-04-02 ENCOUNTER — MYC REFILL (OUTPATIENT)
Dept: INTERNAL MEDICINE | Facility: CLINIC | Age: 40
End: 2020-04-02

## 2020-04-02 DIAGNOSIS — G43.509 PERSISTENT MIGRAINE AURA WITHOUT CEREBRAL INFARCTION AND WITHOUT STATUS MIGRAINOSUS, NOT INTRACTABLE: ICD-10-CM

## 2020-04-03 NOTE — TELEPHONE ENCOUNTER
"Requested Prescriptions   Pending Prescriptions Disp Refills     propranolol (INDERAL) 20 MG tablet  Last Written Prescription Date:  12/28/16  Last Fill Quantity: 180,  # refills: 3   Last office visit: 1/10/2020 with prescribing provider:   Future Office Visit:     180 tablet 3     Sig: Take 1 tablet (20 mg) by mouth 2 times daily       Beta-Blockers Protocol Failed - 4/2/2020 12:12 PM        Failed - Blood pressure under 140/90 in past 12 months     BP Readings from Last 3 Encounters:   01/10/20 (!) 140/90   06/27/19 145/84   12/28/16 120/70             Passed - Patient is age 6 or older        Passed - Recent (12 mo) or future (30 days) visit within the authorizing provider's specialty     Patient has had an office visit with the authorizing provider or a provider within the authorizing providers department within the previous 12 mos or has a future within next 30 days. See \"Patient Info\" tab in inbasket, or \"Choose Columns\" in Meds & Orders section of the refill encounter.              Passed - Medication is active on med list           Routing refill request to provider for review/approval because:  A break in medication  Blood pressure out of range  Not original authorizing provider    Prescription has not been ordered since 2016, sent patient MyChart message to clarify if patient is taking medication and at what dose. Once patient replies prescription can be routed to provider.         "

## 2020-04-08 RX ORDER — PROPRANOLOL HYDROCHLORIDE 20 MG/1
20 TABLET ORAL 2 TIMES DAILY
Qty: 180 TABLET | Refills: 3 | Status: SHIPPED | OUTPATIENT
Start: 2020-04-08 | End: 2021-05-05

## 2020-04-08 NOTE — TELEPHONE ENCOUNTER
Routing refill request to provider for review/approval because:  Patient needs to be seen because:  Per last office visit note on 1/10/20 and was instructed to follow up in about 3 months.      No follow up completed and message was left on voicemail to call clinic back with no return call.  Please advise, thanks.

## 2020-07-19 DIAGNOSIS — F41.8 DEPRESSION WITH ANXIETY: ICD-10-CM

## 2020-07-21 ENCOUNTER — MYC MEDICAL ADVICE (OUTPATIENT)
Dept: INTERNAL MEDICINE | Facility: CLINIC | Age: 40
End: 2020-07-21

## 2020-07-21 RX ORDER — CITALOPRAM HYDROBROMIDE 20 MG/1
TABLET ORAL
Qty: 90 TABLET | Refills: 0 | Status: SHIPPED | OUTPATIENT
Start: 2020-07-21 | End: 2020-09-01

## 2020-07-21 NOTE — TELEPHONE ENCOUNTER
Routing refill request to provider for review/approval because:  Patient needs to be seen because:  Due for follow up visit (with PHQ-9) consider phone or virtual

## 2020-07-24 ENCOUNTER — MYC MEDICAL ADVICE (OUTPATIENT)
Dept: INTERNAL MEDICINE | Facility: CLINIC | Age: 40
End: 2020-07-24

## 2020-08-30 DIAGNOSIS — F41.8 DEPRESSION WITH ANXIETY: ICD-10-CM

## 2020-09-01 RX ORDER — CITALOPRAM HYDROBROMIDE 20 MG/1
TABLET ORAL
Qty: 90 TABLET | Refills: 0 | Status: SHIPPED | OUTPATIENT
Start: 2020-09-01 | End: 2021-01-29

## 2020-09-01 NOTE — TELEPHONE ENCOUNTER
Pending Prescriptions:                       Disp   Refills    citalopram (CELEXA) 20 MG tablet [Pharmacy*90 tab*0        Sig: Take 1 tablet by mouth once daily    Routing refill request to provider for review/approval because:  Patient fails protocol    PHQ-9 score:    PHQ 1/10/2020   PHQ-9 Total Score 10   Q9: Thoughts of better off dead/self-harm past 2 weeks Not at all

## 2020-10-18 ENCOUNTER — MYC MEDICAL ADVICE (OUTPATIENT)
Dept: INTERNAL MEDICINE | Facility: CLINIC | Age: 40
End: 2020-10-18

## 2020-12-14 ENCOUNTER — HEALTH MAINTENANCE LETTER (OUTPATIENT)
Age: 40
End: 2020-12-14

## 2021-01-27 DIAGNOSIS — G25.81 RESTLESS LEG SYNDROME: ICD-10-CM

## 2021-01-28 ENCOUNTER — MYC REFILL (OUTPATIENT)
Dept: INTERNAL MEDICINE | Facility: CLINIC | Age: 41
End: 2021-01-28

## 2021-01-28 DIAGNOSIS — F41.8 DEPRESSION WITH ANXIETY: ICD-10-CM

## 2021-01-28 RX ORDER — CITALOPRAM HYDROBROMIDE 20 MG/1
20 TABLET ORAL DAILY
Qty: 90 TABLET | Refills: 0 | Status: CANCELLED | OUTPATIENT
Start: 2021-01-28

## 2021-01-28 RX ORDER — ROPINIROLE 1 MG/1
TABLET, FILM COATED ORAL
Qty: 30 TABLET | Refills: 0 | Status: SHIPPED | OUTPATIENT
Start: 2021-01-28 | End: 2021-02-26

## 2021-01-28 NOTE — TELEPHONE ENCOUNTER
Pending Prescriptions:                       Disp   Refills    rOPINIRole (REQUIP) 1 MG tablet [Pharmacy *30 tab*0        Sig: TAKE 1 TABLET BY MOUTH AT BEDTIME    Routing refill request to provider for review/approval because:  Patient fails protocol

## 2021-01-29 ENCOUNTER — MYC MEDICAL ADVICE (OUTPATIENT)
Dept: INTERNAL MEDICINE | Facility: CLINIC | Age: 41
End: 2021-01-29

## 2021-01-29 DIAGNOSIS — F41.8 DEPRESSION WITH ANXIETY: ICD-10-CM

## 2021-01-29 RX ORDER — CITALOPRAM HYDROBROMIDE 20 MG/1
20 TABLET ORAL DAILY
Qty: 30 TABLET | Refills: 0 | Status: SHIPPED | OUTPATIENT
Start: 2021-01-29 | End: 2021-02-26

## 2021-02-26 ENCOUNTER — TELEPHONE (OUTPATIENT)
Dept: INTERNAL MEDICINE | Facility: CLINIC | Age: 41
End: 2021-02-26

## 2021-02-26 ENCOUNTER — VIRTUAL VISIT (OUTPATIENT)
Dept: INTERNAL MEDICINE | Facility: CLINIC | Age: 41
End: 2021-02-26
Payer: COMMERCIAL

## 2021-02-26 VITALS — HEIGHT: 62 IN | BODY MASS INDEX: 41.62 KG/M2 | WEIGHT: 226.2 LBS

## 2021-02-26 DIAGNOSIS — M54.2 CERVICALGIA: Primary | ICD-10-CM

## 2021-02-26 DIAGNOSIS — G25.81 RESTLESS LEG SYNDROME: ICD-10-CM

## 2021-02-26 DIAGNOSIS — G43.109 MIGRAINE WITH AURA AND WITHOUT STATUS MIGRAINOSUS, NOT INTRACTABLE: ICD-10-CM

## 2021-02-26 DIAGNOSIS — F41.8 DEPRESSION WITH ANXIETY: ICD-10-CM

## 2021-02-26 PROCEDURE — 99214 OFFICE O/P EST MOD 30 MIN: CPT | Mod: 95 | Performed by: INTERNAL MEDICINE

## 2021-02-26 PROCEDURE — 96127 BRIEF EMOTIONAL/BEHAV ASSMT: CPT | Performed by: INTERNAL MEDICINE

## 2021-02-26 RX ORDER — HYDROCODONE BITARTRATE AND ACETAMINOPHEN 5; 325 MG/1; MG/1
1 TABLET ORAL EVERY 6 HOURS PRN
Qty: 20 TABLET | Refills: 0 | Status: SHIPPED | OUTPATIENT
Start: 2021-02-26 | End: 2021-10-12

## 2021-02-26 RX ORDER — CITALOPRAM HYDROBROMIDE 20 MG/1
30 TABLET ORAL DAILY
Qty: 135 TABLET | Refills: 1 | Status: SHIPPED | OUTPATIENT
Start: 2021-02-26 | End: 2021-03-01

## 2021-02-26 RX ORDER — ROPINIROLE 1 MG/1
1 TABLET, FILM COATED ORAL AT BEDTIME
Qty: 90 TABLET | Refills: 1 | Status: SHIPPED | OUTPATIENT
Start: 2021-02-26 | End: 2021-10-13

## 2021-02-26 ASSESSMENT — ANXIETY QUESTIONNAIRES
7. FEELING AFRAID AS IF SOMETHING AWFUL MIGHT HAPPEN: NOT AT ALL
IF YOU CHECKED OFF ANY PROBLEMS ON THIS QUESTIONNAIRE, HOW DIFFICULT HAVE THESE PROBLEMS MADE IT FOR YOU TO DO YOUR WORK, TAKE CARE OF THINGS AT HOME, OR GET ALONG WITH OTHER PEOPLE: SOMEWHAT DIFFICULT
2. NOT BEING ABLE TO STOP OR CONTROL WORRYING: SEVERAL DAYS
3. WORRYING TOO MUCH ABOUT DIFFERENT THINGS: MORE THAN HALF THE DAYS
1. FEELING NERVOUS, ANXIOUS, OR ON EDGE: NEARLY EVERY DAY
6. BECOMING EASILY ANNOYED OR IRRITABLE: NEARLY EVERY DAY
GAD7 TOTAL SCORE: 13
5. BEING SO RESTLESS THAT IT IS HARD TO SIT STILL: MORE THAN HALF THE DAYS

## 2021-02-26 ASSESSMENT — PATIENT HEALTH QUESTIONNAIRE - PHQ9
5. POOR APPETITE OR OVEREATING: MORE THAN HALF THE DAYS
SUM OF ALL RESPONSES TO PHQ QUESTIONS 1-9: 8

## 2021-02-26 ASSESSMENT — MIFFLIN-ST. JEOR: SCORE: 1659.29

## 2021-02-26 NOTE — PATIENT INSTRUCTIONS
Consider seeing Any, our counselor at the clinic.      20 minutes per day of exercise    Due for pap smear

## 2021-02-26 NOTE — TELEPHONE ENCOUNTER
Prior Authorization Retail Medication Request    Medication/Dose: Citalopram  ICD code (if different than what is on RX):  unknown  Previously Tried and Failed:  unknown  Rationale:  unknown    Insurance Name:  unknown  Insurance ID:  unknown      Pharmacy Information (if different than what is on RX)  Name:  [facility]  Phone:  [phone]

## 2021-02-26 NOTE — PROGRESS NOTES
"Lidya is a 40 year old who is being evaluated via a billable video visit.      How would you like to obtain your AVS? MyChart  If the video visit is dropped, the invitation should be resent by: Text to cell phone: [phone]  Will anyone else be joining your video visit? No    Video Start Time:7:45am  Assessment & Plan     Depression with anxiety  Not at goal  - citalopram (CELEXA) 20 MG tablet; Take 1.5 tablets (30 mg) by mouth daily  Increase from 20mg to 30mg  See a counselor  Continue to exercise    -f/u in 1-3 months for physical also with pap    Restless leg syndrome  - rOPINIRole (REQUIP) 1 MG tablet; Take 1 tablet (1 mg) by mouth At Bedtime    Migraine with aura and without status migrainosus, not intractable  - HYDROcodone-acetaminophen (NORCO) 5-325 MG tablet; Take 1 tablet by mouth every 6 hours as needed for moderate to severe pain    Cervicalgia  - Orthopedic & Spine  Referral; Future  - Miscellaneous Order for DME - ONLY FOR DME        Due for a pap smear   Patient aware she is due for a pap smear        31 minutes spent on the date of the encounter doing chart review and documentation        BMI:   Estimated body mass index is 41.37 kg/m  as calculated from the following:    Height as of this encounter: 1.575 m (5' 2\").    Weight as of this encounter: 102.6 kg (226 lb 3.2 oz).       See Patient Instructions    Return in about 3 months (around 5/26/2021) for Physical Exam.    Lisandra Magana MD  [facility] is a 40 year old who presents for the following health issues : refills and diabetes follow up.    HPI       Migraines.  She is on propranolol she has had the aura with headaches. She uses imitrex as needed. She has 20 pills of norco that last her one year.  She has been to neurology.    Depression/anxiety. She is on celexa.  She is not seeing a counselor.  No caffeine or alcohol. Sleeps to 6-8 hours per night.   She is exercising through " weight watchers.   PHQ 12/28/2016 1/10/2020 2/26/2021   PHQ-9 Total Score 23 10 8   Q9: Thoughts of better off dead/self-harm past 2 weeks Several days Not at all Not at all     RIGO-7 SCORE 12/28/2016 1/10/2020 2/26/2021   Total Score - - -   Total Score 20 13 13       Asthma.  Triggered by seasonal and colds.   ACT Total Scores 2/18/2015 1/10/2020 2/26/2021   ACT TOTAL SCORE 23 - -   ASTHMA ER VISITS 0 = None - -   ASTHMA HOSPITALIZATIONS 0 = None - -   ACT TOTAL SCORE (Goal Greater than or Equal to 20) - 24 25   In the past 12 months, how many times did you visit the emergency room for your asthma without being admitted to the hospital? - 0 0   In the past 12 months, how many times were you hospitalized overnight because of your asthma? - 0 0     Neck pain. Cervical disc. Has tried physical therapyShe has numbness/tingling in her hands and shoulder.       BP Readings from Last 2 Encounters:   01/10/20 (!) 140/90   06/27/19 145/84     LDL Cholesterol Calculated (mg/dL)   Date Value   01/10/2020 120 (H)   11/29/2013 81       Obesity.  She is doing weight watchers for 7 weeks.  She started at 240 and is down to 246lb.   Soda intake has drastically decreased.       How many servings of fruits and vegetables do you eat daily?  2-3    On average, how many sweetened beverages do you drink each day (Examples: soda, juice, sweet tea, etc.  Do NOT count diet or artificially sweetened beverages)?   0    How many days per week do you exercise enough to make your heart beat faster? 3 or less    How many minutes a day do you exercise enough to make your heart beat faster? 9 or less    How many days per week do you miss taking your medication? 0        Review of Systems   CONSTITUTIONAL: NEGATIVE for fever, chills, change in weight  RESP: NEGATIVE for significant cough or SOB  CV: NEGATIVE for chest pain, palpitations or peripheral edema  MSK: neck pain       Objective           Vitals:  No vitals were obtained today due to  virtual visit.    Physical Exam   GENERAL: Healthy, alert and no distress  EYES: Eyes grossly normal to inspection.  No discharge or erythema, or obvious scleral/conjunctival abnormalities.  RESP: No audible wheeze, cough, or visible cyanosis.  No visible retractions or increased work of breathing.    SKIN: Visible skin clear. No significant rash, abnormal pigmentation or lesions.  NEURO: Cranial nerves grossly intact.  Mentation and speech appropriate for age.  PSYCH: Mentation appears normal, affect normal/bright, judgement and insight intact, normal speech and appearance well-groomed.            Video-Visit Details    Type of service:  Video Visit    Video End Time:8:12am    Originating Location (pt. Location): home    Distant Location (provider location):  [facility]     Platform used for Video Visit: Easy Tempo

## 2021-02-27 ASSESSMENT — ASTHMA QUESTIONNAIRES: ACT_TOTALSCORE: 25

## 2021-02-27 ASSESSMENT — ANXIETY QUESTIONNAIRES: GAD7 TOTAL SCORE: 13

## 2021-03-01 ENCOUNTER — TELEPHONE (OUTPATIENT)
Dept: INTERNAL MEDICINE | Facility: CLINIC | Age: 41
End: 2021-03-01

## 2021-03-01 ENCOUNTER — MYC MEDICAL ADVICE (OUTPATIENT)
Dept: INTERNAL MEDICINE | Facility: CLINIC | Age: 41
End: 2021-03-01

## 2021-03-01 DIAGNOSIS — F32.0 MILD MAJOR DEPRESSION (H): Primary | ICD-10-CM

## 2021-03-01 RX ORDER — CITALOPRAM HYDROBROMIDE 40 MG/1
40 TABLET ORAL DAILY
Qty: 90 TABLET | Refills: 0 | Status: SHIPPED | OUTPATIENT
Start: 2021-03-01 | End: 2021-06-03

## 2021-03-01 NOTE — TELEPHONE ENCOUNTER
Left message for patient to call back  Dr Magana did an order for tens unit. Please ask the patient what she would like done with this.

## 2021-03-01 NOTE — TELEPHONE ENCOUNTER
Central Prior Authorization Team   Phone: [phone]    PA Initiation    Medication: Citalopram  Insurance Company: [employer] [place] - Phone [phone] Fax [phone]  Pharmacy Filling the Rx: [facility] [address] [place] [address] - [id number]  Filling Pharmacy Phone: [phone]  Filling Pharmacy Fax: [phone]  Start Date: 3/1/2021

## 2021-03-02 ENCOUNTER — MYC MEDICAL ADVICE (OUTPATIENT)
Dept: INTERNAL MEDICINE | Facility: CLINIC | Age: 41
End: 2021-03-02

## 2021-04-18 ENCOUNTER — HEALTH MAINTENANCE LETTER (OUTPATIENT)
Age: 41
End: 2021-04-18

## 2021-04-27 ENCOUNTER — OFFICE VISIT (OUTPATIENT)
Dept: INTERNAL MEDICINE | Facility: CLINIC | Age: 41
End: 2021-04-27
Payer: COMMERCIAL

## 2021-04-27 VITALS
OXYGEN SATURATION: 99 % | HEIGHT: 63 IN | DIASTOLIC BLOOD PRESSURE: 80 MMHG | HEART RATE: 95 BPM | RESPIRATION RATE: 12 BRPM | SYSTOLIC BLOOD PRESSURE: 120 MMHG | TEMPERATURE: 98 F | BODY MASS INDEX: 41.46 KG/M2 | WEIGHT: 234 LBS

## 2021-04-27 DIAGNOSIS — M50.20 PROTRUDED CERVICAL DISC: ICD-10-CM

## 2021-04-27 DIAGNOSIS — M79.672 LEFT FOOT PAIN: ICD-10-CM

## 2021-04-27 DIAGNOSIS — E66.01 MORBID OBESITY (H): ICD-10-CM

## 2021-04-27 DIAGNOSIS — M62.838 MUSCLE SPASM: ICD-10-CM

## 2021-04-27 DIAGNOSIS — J31.0 CHRONIC RHINITIS: ICD-10-CM

## 2021-04-27 DIAGNOSIS — Z00.00 ROUTINE GENERAL MEDICAL EXAMINATION AT A HEALTH CARE FACILITY: Primary | ICD-10-CM

## 2021-04-27 DIAGNOSIS — M54.2 NECK PAIN: ICD-10-CM

## 2021-04-27 DIAGNOSIS — E16.2 HYPOGLYCEMIA: ICD-10-CM

## 2021-04-27 DIAGNOSIS — F32.0 MILD MAJOR DEPRESSION (H): ICD-10-CM

## 2021-04-27 DIAGNOSIS — G43.501 PERSISTENT MIGRAINE AURA WITHOUT CEREBRAL INFARCTION AND WITH STATUS MIGRAINOSUS, NOT INTRACTABLE: ICD-10-CM

## 2021-04-27 PROCEDURE — 87624 HPV HI-RISK TYP POOLED RSLT: CPT | Performed by: INTERNAL MEDICINE

## 2021-04-27 PROCEDURE — G0145 SCR C/V CYTO,THINLAYER,RESCR: HCPCS | Performed by: INTERNAL MEDICINE

## 2021-04-27 PROCEDURE — 99395 PREV VISIT EST AGE 18-39: CPT | Performed by: INTERNAL MEDICINE

## 2021-04-27 RX ORDER — MOMETASONE FUROATE MONOHYDRATE 50 UG/1
2 SPRAY, METERED NASAL DAILY
Qty: 17 G | Refills: 1 | Status: SHIPPED | OUTPATIENT
Start: 2021-04-27

## 2021-04-27 RX ORDER — CYCLOBENZAPRINE HCL 5 MG
5 TABLET ORAL 3 TIMES DAILY PRN
Qty: 90 TABLET | Refills: 1 | Status: SHIPPED | OUTPATIENT
Start: 2021-04-27 | End: 2021-10-12

## 2021-04-27 ASSESSMENT — MIFFLIN-ST. JEOR: SCORE: 1702.61

## 2021-04-27 NOTE — NURSING NOTE
"/80   Pulse 95   Temp 98  F (36.7  C) (Oral)   Resp 12   Ht 1.588 m (5' 2.5\")   Wt 106.1 kg (234 lb)   SpO2 99%   Breastfeeding No   BMI 42.12 kg/m      "

## 2021-04-27 NOTE — PROGRESS NOTES
SUBJECTIVE:   CC: iLdya Montelongo is an 40 year old woman who presents for preventive health visit.       Patient has been advised of split billing requirements and indicates understanding: Yes  Healthy Habits:    Getting at least 3 servings of Calcium per day:  Yes    Bi-annual eye exam:  Yes    Dental care twice a year:  Yes    Sleep apnea or symptoms of sleep apnea:  None    Diet:  Regular (no restrictions)    Taking medications regularly:  Yes    Barriers to taking medications:  Not applicable    Medication side effects:  Not applicable    PHQ-2 Total Score:      Migraines. She has migraines once per month. She is on propranolol.   Uses norco sparingly if her imitrex does not work.   She needs a new medication for her muscle spasm of her neck.    Depression. PHQ9 of 8. She is on celexa.  She has not seen a counselor    Left foot pain. She has had left foot pain after walking on the top part of her foot for several months.     Today's PHQ-2 Score:   PHQ-2 ( 1999 [employer]) 6/11/2014   Q1: Little interest or pleasure in doing things 0   Q2: Feeling down, depressed or hopeless 0   PHQ-2 Score 0       Abuse: Current or Past (Physical, Sexual or Emotional) - No  Do you feel safe in your environment? Yes    Have you ever done Advance Care Planning? (For example, a Health Directive, POLST, or a discussion with a medical provider or your loved ones about your wishes): nocmp      Social History     Tobacco Use     Smoking status: Never Smoker     Smokeless tobacco: Never Used   Substance Use Topics     Alcohol use: No     If you drink alcohol do you typically have >3 drinks per day or >7 drinks per week? No    Alcohol Use 4/27/2021   Prescreen: >3 drinks/day or >7 drinks/week? No       Reviewed orders with patient.  Reviewed health maintenance and updated orders accordingly - Yes    Breast Cancer Screening:  Any new diagnosis of family breast, ovarian, or bowel cancer? no    FSH-7: No flowsheet data  "found.    Pertinent mammograms are reviewed under the imaging tab.    History of abnormal Pap smear: NO - age 30- 65 PAP every 3 years recommended     Reviewed and updated as needed this visit by clinical staff  Tobacco   Meds   Med Hx  Surg Hx  Fam Hx  Soc Hx        Reviewed and updated as needed this visit by Provider                    Review of Systems  CONSTITUTIONAL: NEGATIVE for fever, chills, change in weight  INTEGUMENTARU/SKIN: NEGATIVE for worrisome rashes, moles or lesions  EYES: NEGATIVE for vision changes or irritation  ENT: NEGATIVE for ear, mouth and throat problems  RESP: NEGATIVE for significant cough or SOB  BREAST: NEGATIVE for masses, tenderness or discharge  CV: NEGATIVE for chest pain, palpitations or peripheral edema  GI: NEGATIVE for nausea, abdominal pain, heartburn, or change in bowel habits  : NEGATIVE for unusual urinary or vaginal symptoms. Periods are regular.  MUSCULOSKELETAL: NEGATIVE for significant arthralgias or myalgia  NEURO: NEGATIVE for weakness, dizziness or paresthesias  PSYCHIATRIC: NEGATIVE for changes in mood or affect     OBJECTIVE:   /80   Pulse 95   Temp 98  F (36.7  C) (Oral)   Resp 12   Ht 1.588 m (5' 2.5\")   Wt 106.1 kg (234 lb)   SpO2 99%   Breastfeeding No   BMI 42.12 kg/m    Physical Exam  GENERAL APPEARANCE: healthy, alert and no distress  EYES: Eyes grossly normal to inspection, PERRL and conjunctivae and sclerae normal  HENT: ear canals and TM's normal, nose and mouth without ulcers or lesions, oropharynx clear and oral mucous membranes moist  NECK: no adenopathy, no asymmetry, masses, or scars and thyroid normal to palpation  RESP: lungs clear to auscultation - no rales, rhonchi or wheezes  BREAST: normal without masses, tenderness or nipple discharge and no palpable axillary masses or adenopathy  CV: regular rate and rhythm, normal S1 S2, no S3 or S4, no murmur, click or rub, no peripheral edema and peripheral pulses strong  ABDOMEN: " soft, nontender, no hepatosplenomegaly, no masses and bowel sounds normal  : Pelvic exam: normal vagina and vulva, normal cervix without lesions or tenderness, uterus normal size anteverted, adenxa normal in size without tenderness, pap smear done  MS: no musculoskeletal defects are noted and gait is age appropriate without ataxia  SKIN: no suspicious lesions or rashes  NEURO: Normal strength and tone, sensory exam grossly normal, mentation intact and speech normal  PSYCH: mentation appears normal and affect normal/bright    Diagnostic Test Results:  Labs reviewed in Epic    ASSESSMENT/PLAN:       (Z00.00) Routine general medical examination at a health care facility  (primary encounter diagnosis)  Comment: assess  Plan: Lipid panel reflex to direct LDL Fasting,         **Comprehensive metabolic panel FUTURE anytime,        **CBC with platelets FUTURE anytime, **TSH with        free T4 reflex FUTURE anytime, Pap imaged thin         layer screen with HPV - recommended age 30 - 65        years (select HPV order below), HPV High Risk         Types DNA Cervical            (F32.0) Mild major depression (H)  Comment: close to goal  Plan: celexa    (E66.01) Morbid obesity (H)  Comment:   Plan: diet and exercise    (M54.2) Neck pain  Comment:   Plan: Miscellaneous Order for DME - ONLY FOR DME            (M50.20) Protruded cervical disc  Comment:   Plan: Miscellaneous Order for DME - ONLY FOR DME            (G43.501) Persistent migraine aura without cerebral infarction and with status migrainosus, not intractable  Comment:   Plan:     (E16.2) Hypoglycemia  Comment: assess  Plan: blood glucose (NO BRAND SPECIFIED) test strip            (J31.0) Chronic rhinitis  Comment:   Plan: mometasone (NASONEX) 50 MCG/ACT nasal spray            (M62.838) Muscle spasm  Comment:   Plan: cyclobenzaprine (FLEXERIL) 5 MG tablet        Trial of flexeril rather than methocarbamol    (M79.672) Left foot pain  Comment: assess  Plan: Orthopedic &  "Spine  Referral                  Patient has been advised of split billing requirements and indicates understanding: Yes  COUNSELING:  Reviewed preventive health counseling, as reflected in patient instructions       Regular exercise       Healthy diet/nutrition    Estimated body mass index is 42.12 kg/m  as calculated from the following:    Height as of this encounter: 1.588 m (5' 2.5\").    Weight as of this encounter: 106.1 kg (234 lb).        She reports that she has never smoked. She has never used smokeless tobacco.      Counseling Resources:  ATP IV Guidelines  Pooled Cohorts Equation Calculator  Breast Cancer Risk Calculator  BRCA-Related Cancer Risk Assessment: FHS-7 Tool  FRAX Risk Assessment  ICSI Preventive Guidelines  Dietary Guidelines for Americans, 2010  USDA's MyPlate  ASA Prophylaxis  Lung CA Screening    Lisandra Magana MD  [facility]  "

## 2021-04-30 LAB
COPATH REPORT: NORMAL
PAP: NORMAL

## 2021-04-30 ASSESSMENT — PATIENT HEALTH QUESTIONNAIRE - PHQ9: SUM OF ALL RESPONSES TO PHQ QUESTIONS 1-9: 8

## 2021-05-01 ASSESSMENT — ASTHMA QUESTIONNAIRES: ACT_TOTALSCORE: 24

## 2021-05-03 LAB
FINAL DIAGNOSIS: NORMAL
HPV HR 12 DNA CVX QL NAA+PROBE: NEGATIVE
HPV16 DNA SPEC QL NAA+PROBE: NEGATIVE
HPV18 DNA SPEC QL NAA+PROBE: NEGATIVE
SPECIMEN DESCRIPTION: NORMAL
SPECIMEN SOURCE CVX/VAG CYTO: NORMAL

## 2021-05-05 ENCOUNTER — MYC MEDICAL ADVICE (OUTPATIENT)
Dept: INTERNAL MEDICINE | Facility: CLINIC | Age: 41
End: 2021-05-05

## 2021-05-05 DIAGNOSIS — G43.509 PERSISTENT MIGRAINE AURA WITHOUT CEREBRAL INFARCTION AND WITHOUT STATUS MIGRAINOSUS, NOT INTRACTABLE: ICD-10-CM

## 2021-05-05 RX ORDER — PROPRANOLOL HYDROCHLORIDE 20 MG/1
TABLET ORAL
Qty: 180 TABLET | Refills: 1 | Status: SHIPPED | OUTPATIENT
Start: 2021-05-05 | End: 2022-10-07

## 2021-05-05 NOTE — TELEPHONE ENCOUNTER
"Patient sent another message stating she is out of propranolol x2 days  Routing refill request to provider for review/approval because:  Patient has been out of med x2 days, needs ASAP.  Routing refill request to provider for review/approval because:  BP reading out of range once      Requested Prescriptions   Pending Prescriptions Disp Refills     propranolol (INDERAL) 20 MG tablet [Pharmacy Med Name: Propranolol HCl 20 MG Oral Tablet] 180 tablet 0     Sig: Take 1 tablet by mouth twice daily       Beta-Blockers Protocol Passed - 5/5/2021 10:13 AM        Passed - Blood pressure under 140/90 in past 12 months     BP Readings from Last 3 Encounters:   04/27/21 120/80   01/10/20 (!) 140/90   06/27/19 145/84                 Passed - Patient is age 6 or older        Passed - Recent (12 mo) or future (30 days) visit within the authorizing provider's specialty     Patient has had an office visit with the authorizing provider or a provider within the authorizing providers department within the previous 12 mos or has a future within next 30 days. See \"Patient Info\" tab in inbasket, or \"Choose Columns\" in Meds & Orders section of the refill encounter.              Passed - Medication is active on med list             "

## 2021-06-01 DIAGNOSIS — F32.0 MILD MAJOR DEPRESSION (H): ICD-10-CM

## 2021-06-03 RX ORDER — CITALOPRAM HYDROBROMIDE 40 MG/1
TABLET ORAL
Qty: 90 TABLET | Refills: 0 | Status: SHIPPED | OUTPATIENT
Start: 2021-06-03 | End: 2021-09-30

## 2021-06-03 NOTE — TELEPHONE ENCOUNTER
Pending Prescriptions:                       Disp   Refills    citalopram (CELEXA) 40 MG tablet [Pharmacy*90 tab*0        Sig: Take 1 tablet by mouth once daily    Routing refill request to provider for review/approval because:  PHQ-9 score:    PHQ 4/30/2021   PHQ-9 Total Score 8   Q9: Thoughts of better off dead/self-harm past 2 weeks Not at all

## 2021-10-02 ENCOUNTER — HEALTH MAINTENANCE LETTER (OUTPATIENT)
Age: 41
End: 2021-10-02

## 2021-10-12 PROBLEM — E66.01 MORBID OBESITY (H): Status: RESOLVED | Noted: 2020-01-10 | Resolved: 2021-10-12

## 2021-10-13 ENCOUNTER — VIRTUAL VISIT (OUTPATIENT)
Dept: INTERNAL MEDICINE | Facility: CLINIC | Age: 41
End: 2021-10-13
Payer: COMMERCIAL

## 2021-10-13 VITALS
TEMPERATURE: 97.6 F | RESPIRATION RATE: 18 BRPM | WEIGHT: 250 LBS | SYSTOLIC BLOOD PRESSURE: 132 MMHG | BODY MASS INDEX: 45 KG/M2 | OXYGEN SATURATION: 98 % | DIASTOLIC BLOOD PRESSURE: 86 MMHG | HEART RATE: 72 BPM

## 2021-10-13 DIAGNOSIS — E55.9 VITAMIN D DEFICIENCY: ICD-10-CM

## 2021-10-13 DIAGNOSIS — E55.9 VITAMIN D DEFICIENCY: Primary | ICD-10-CM

## 2021-10-13 DIAGNOSIS — E66.01 MORBID OBESITY (H): ICD-10-CM

## 2021-10-13 DIAGNOSIS — Z13.29 SCREENING FOR THYROID DISORDER: ICD-10-CM

## 2021-10-13 DIAGNOSIS — Z23 NEED FOR VACCINATION: ICD-10-CM

## 2021-10-13 DIAGNOSIS — Z13.1 SCREENING FOR DIABETES MELLITUS: ICD-10-CM

## 2021-10-13 DIAGNOSIS — Z76.89 ENCOUNTER TO ESTABLISH CARE: ICD-10-CM

## 2021-10-13 DIAGNOSIS — F41.1 GAD (GENERALIZED ANXIETY DISORDER): Primary | ICD-10-CM

## 2021-10-13 DIAGNOSIS — Z13.220 SCREENING FOR CHOLESTEROL LEVEL: ICD-10-CM

## 2021-10-13 DIAGNOSIS — G25.81 RESTLESS LEGS SYNDROME (RLS): ICD-10-CM

## 2021-10-13 LAB
ALBUMIN SERPL-MCNC: 3.4 G/DL (ref 3.4–5)
ALP SERPL-CCNC: 49 U/L (ref 40–150)
ALT SERPL W P-5'-P-CCNC: 19 U/L (ref 0–50)
ANION GAP SERPL CALCULATED.3IONS-SCNC: 9 MMOL/L (ref 3–14)
AST SERPL W P-5'-P-CCNC: 14 U/L (ref 0–45)
BILIRUB SERPL-MCNC: 0.3 MG/DL (ref 0.2–1.3)
BUN SERPL-MCNC: 15 MG/DL (ref 7–30)
CALCIUM SERPL-MCNC: 8.4 MG/DL (ref 8.5–10.1)
CHLORIDE BLD-SCNC: 105 MMOL/L (ref 94–109)
CHOLEST SERPL-MCNC: 222 MG/DL
CO2 SERPL-SCNC: 20 MMOL/L (ref 20–32)
CREAT SERPL-MCNC: 0.73 MG/DL (ref 0.52–1.04)
DEPRECATED CALCIDIOL+CALCIFEROL SERPL-MC: 12 UG/L (ref 20–75)
FASTING STATUS PATIENT QL REPORTED: YES
GFR SERPL CREATININE-BSD FRML MDRD: >90 ML/MIN/1.73M2
GLUCOSE BLD-MCNC: 98 MG/DL (ref 70–99)
HDLC SERPL-MCNC: 43 MG/DL
LDLC SERPL CALC-MCNC: 125 MG/DL
NONHDLC SERPL-MCNC: 179 MG/DL
POTASSIUM BLD-SCNC: 4.4 MMOL/L (ref 3.4–5.3)
PROT SERPL-MCNC: 7.6 G/DL (ref 6.8–8.8)
SODIUM SERPL-SCNC: 134 MMOL/L (ref 133–144)
T4 FREE SERPL-MCNC: 0.88 NG/DL (ref 0.76–1.46)
TRIGL SERPL-MCNC: 271 MG/DL
TSH SERPL DL<=0.005 MIU/L-ACNC: 6.35 MU/L (ref 0.4–4)

## 2021-10-13 PROCEDURE — 80061 LIPID PANEL: CPT | Performed by: INTERNAL MEDICINE

## 2021-10-13 PROCEDURE — 84439 ASSAY OF FREE THYROXINE: CPT | Performed by: INTERNAL MEDICINE

## 2021-10-13 PROCEDURE — 90471 IMMUNIZATION ADMIN: CPT | Performed by: INTERNAL MEDICINE

## 2021-10-13 PROCEDURE — 84443 ASSAY THYROID STIM HORMONE: CPT | Performed by: INTERNAL MEDICINE

## 2021-10-13 PROCEDURE — 36415 COLL VENOUS BLD VENIPUNCTURE: CPT | Performed by: INTERNAL MEDICINE

## 2021-10-13 PROCEDURE — 80053 COMPREHEN METABOLIC PANEL: CPT | Performed by: INTERNAL MEDICINE

## 2021-10-13 PROCEDURE — 99214 OFFICE O/P EST MOD 30 MIN: CPT | Mod: 25 | Performed by: INTERNAL MEDICINE

## 2021-10-13 PROCEDURE — 82306 VITAMIN D 25 HYDROXY: CPT | Performed by: INTERNAL MEDICINE

## 2021-10-13 PROCEDURE — 90714 TD VACC NO PRESV 7 YRS+ IM: CPT | Performed by: INTERNAL MEDICINE

## 2021-10-13 RX ORDER — SUMATRIPTAN 100 MG/1
100 TABLET, FILM COATED ORAL
COMMUNITY
Start: 2021-10-13

## 2021-10-13 RX ORDER — HYDROXYZINE HYDROCHLORIDE 25 MG/1
25 TABLET, FILM COATED ORAL 3 TIMES DAILY PRN
Qty: 90 TABLET | Refills: 3 | Status: SHIPPED | OUTPATIENT
Start: 2021-10-13

## 2021-10-13 RX ORDER — CETIRIZINE HYDROCHLORIDE 10 MG/1
10 TABLET ORAL DAILY
COMMUNITY
Start: 2021-10-13

## 2021-10-13 RX ORDER — CHOLECALCIFEROL (VITAMIN D3) 50 MCG
1 TABLET ORAL DAILY
Qty: 90 TABLET | Refills: 3 | Status: SHIPPED | OUTPATIENT
Start: 2021-10-13

## 2021-10-13 RX ORDER — ERGOCALCIFEROL 1.25 MG/1
50000 CAPSULE, LIQUID FILLED ORAL WEEKLY
Qty: 12 CAPSULE | Refills: 1 | Status: SHIPPED | OUTPATIENT
Start: 2021-10-13

## 2021-10-13 RX ORDER — ROPINIROLE 1 MG/1
1 TABLET, FILM COATED ORAL AT BEDTIME
Qty: 90 TABLET | Refills: 3 | Status: SHIPPED | OUTPATIENT
Start: 2021-10-13 | End: 2022-10-07

## 2021-10-13 ASSESSMENT — PATIENT HEALTH QUESTIONNAIRE - PHQ9: SUM OF ALL RESPONSES TO PHQ QUESTIONS 1-9: 9

## 2021-10-13 NOTE — PROGRESS NOTES
ASSESSMENT/PLAN                                                       (F41.1) RIGO (generalized anxiety disorder)  (primary encounter diagnosis)  Comment: poorly-controlled on current regimen.   Plan: CONTINUE Celexa; agree with counseling; TRIAL of hydroxyzine as needed.    (Z76.89) Encounter to establish care  Comment: PMH, PSH, FH, SH, medications, allergies, immunizations, and preventative health measures reviewed and updated as appropriate.  Plan: see below for plans.      (Z13.220) Screening for cholesterol level  (Z13.1) Screening for diabetes mellitus  (Z13.29) Screening for thyroid disorder  (E55.9) Vitamin D deficiency  Plan: fasting labs today.     (Z23) Need for vaccination  Plan: TD given today.     (E66.01) Morbid obesity (H)  Comment: known issue that I take into account for their medical decisions, no current exacerbations or new concerns.    (G25.81) Restless leg syndrome  Comment: well-controlled on current regimen.    Plan: continue present management; refills provided.     Veda Rust MD   [address] [address]  T: [phone], F: [phone]    SUBJECTIVE                                                      Lidya Montelongo is a very pleasant 41 year old female who presents to establish care:    Patient reports poorly controlled anxiety. She suspects this is related to current stressors related to her son. She is currently on Celexa 40 mg daily for depression. Depression is reasonably controlled on this. She is scheduled to speak to a therapist later this fall. She has tried BuSpar in the past and did not tolerate it.    PMH, PSH, FH, SH, medications, allergies, immunizations, and preventative health measures reviewed and updated as appropriate.    Past Medical History:   Diagnosis Date     Dermatographia      MDD (major depressive disorder)      Migraine with aura      Morbid obesity (H)      Past Surgical History:   Procedure Laterality  Date      SECTION       TUBAL LIGATION       Family History   Problem Relation Age of Onset     Hypertension Mother      Diabetes Type 2  Mother      Hypertension Father      Hyperlipidemia Father      Myocardial Infarction Father         late 50s     Hypertension Brother      Hypertension Maternal Grandmother      Diabetes Type 2  Maternal Grandmother      Colon Cancer Maternal Grandmother      Hypertension Maternal Grandfather      Cerebrovascular Disease Maternal Grandfather      Hypertension Paternal Grandmother      Cerebrovascular Disease Paternal Grandmother      Hypertension Paternal Grandfather      Cerebrovascular Disease Paternal Grandfather      Breast Cancer No family hx of      Ovarian Cancer No family hx of      Coronary Artery Disease Early Onset No family hx of      Social History     Occupational History     Occupation: Childcare   Tobacco Use     Smoking status: Never Smoker     Smokeless tobacco: Never Used   Substance and Sexual Activity     Alcohol use: No     Drug use: No     Sexual activity: Yes     Partners: Male     Birth control/protection: Female Surgical   Social History Narrative    .    Two kids (10 and 12 as of ).    No regular exercise.     Allergies   Allergen Reactions     Amoxicillin Rash     Latex Itching     Magnesium Itching     Current Outpatient Medications   Medication Sig     cetirizine (ZYRTEC) 10 MG tablet Take 1 tablet (10 mg) by mouth daily     citalopram (CELEXA) 40 MG tablet Take 1 tablet (40 mg) by mouth daily     hydrOXYzine (ATARAX) 25 MG tablet Take 1 tablet (25 mg) by mouth 3 times daily as needed for anxiety     mometasone (NASONEX) 50 MCG/ACT nasal spray Spray 2 sprays into both nostrils daily     propranolol (INDERAL) 20 MG tablet Take 1 tablet by mouth twice daily     rOPINIRole (REQUIP) 1 MG tablet Take 1 tablet (1 mg) by mouth At Bedtime     SUMAtriptan (IMITREX) 100 MG tablet Take 1 tablet (100 mg) by mouth at onset of headache for  migraine     Immunization History   Administered Date(s) Administered     COVID-19,PF,Moderna 01/30/2021, 02/27/2021     DTaP, Unspecified 11/10/2011     HPV Quadrivalent 05/04/2007, 07/09/2007, 10/19/2007     Hep B, Peds or Adolescent 12/01/1994, 01/06/1995, 06/08/1995     Historical DTP/aP 1982, 01/22/1983, 03/15/1983, 11/16/1984, 04/07/1987     Influenza (H1N1) 12/30/2009     Influenza (IIV3) PF 09/29/2009     MMR 11/26/1983, 12/01/1994     OPV, trivalent, live 1982, 01/22/1983, 03/15/1983, 08/18/1987     TD (ADULT, 7+) 06/05/1996, 05/04/2006     Tdap (Adacel,Boostrix) 10/31/2011     PREVENTATIVE HEALTH                                                      BMI: morbidly obese  Blood pressure: within normal limits   Breast CA screening: not medically indicated at this time   Cervical CA screening: up to date   Colon CA screening: not medically indicated at this time   Lung CA screening: n/a   Dexa: not medically indicated at this time   Screening cholesterol: DUE  Screening diabetes: DUE  STD testing: STD testing offered - declined by patient  Alcohol misuse screening: alcohol use reviewed - no intervention indicated at this time  Immunizations: reviewed; TD DUE and flu shot DUE - patient declines    OBJECTIVE                                                      /86 (BP Location: Left arm, Patient Position: Chair, Cuff Size: Adult Large)   Pulse 72   Temp 97.6  F (36.4  C) (Temporal)   Resp 18   Wt 113.4 kg (250 lb)   LMP 10/06/2021 (Within Days)   SpO2 98%   BMI 45.00 kg/m    Constitutional: well-appearing  Psych: normal judgment and insight; normal mood and affect; recent and remote memory intact    ---  (Note was completed, in part, with [employer] voice-recognition software. Documentation was reviewed, but some grammatical, spelling, and word errors may remain.)

## 2021-10-13 NOTE — PATIENT INSTRUCTIONS
TD today.    Fasting labs today.    Hydroxyzine 3x/day as needed for anxiety.     Detail Level: Zone

## 2021-10-14 ASSESSMENT — ASTHMA QUESTIONNAIRES: ACT_TOTALSCORE: 24

## 2022-01-23 ENCOUNTER — MYC MEDICAL ADVICE (OUTPATIENT)
Dept: INTERNAL MEDICINE | Facility: CLINIC | Age: 42
End: 2022-01-23
Payer: COMMERCIAL

## 2022-01-23 DIAGNOSIS — F32.0 MILD MAJOR DEPRESSION (H): ICD-10-CM

## 2022-01-25 RX ORDER — CITALOPRAM HYDROBROMIDE 40 MG/1
40 TABLET ORAL DAILY
Qty: 90 TABLET | Refills: 0 | Status: SHIPPED | OUTPATIENT
Start: 2022-01-25 | End: 2022-05-26

## 2022-01-25 NOTE — TELEPHONE ENCOUNTER
Routing refill request to provider for review/approval because:  Pt established care 10/2021, last ordered by previous PCP  PHQ9 scored 9 on 10/13/21  Polina Shepherd RN

## 2022-05-26 ENCOUNTER — MYC REFILL (OUTPATIENT)
Dept: INTERNAL MEDICINE | Facility: CLINIC | Age: 42
End: 2022-05-26
Payer: COMMERCIAL

## 2022-05-26 DIAGNOSIS — F32.0 MILD MAJOR DEPRESSION (H): ICD-10-CM

## 2022-05-27 RX ORDER — CITALOPRAM HYDROBROMIDE 40 MG/1
40 TABLET ORAL DAILY
Qty: 90 TABLET | Refills: 0 | Status: SHIPPED | OUTPATIENT
Start: 2022-05-27 | End: 2022-10-07

## 2022-05-27 NOTE — TELEPHONE ENCOUNTER
Routing refill request to provider for review/approval because:   PHQ-9 score less than 5 in past 6 months    Recent (6 mo) or future (30 days) visit within the authorizing provider's specialty       Eduard Boyer RN  [facility] Triage Nurse

## 2022-07-09 ENCOUNTER — HEALTH MAINTENANCE LETTER (OUTPATIENT)
Age: 42
End: 2022-07-09

## 2022-07-23 ENCOUNTER — APPOINTMENT (OUTPATIENT)
Dept: GENERAL RADIOLOGY | Facility: CLINIC | Age: 42
End: 2022-07-23
Attending: EMERGENCY MEDICINE
Payer: COMMERCIAL

## 2022-07-23 ENCOUNTER — HOSPITAL ENCOUNTER (EMERGENCY)
Facility: CLINIC | Age: 42
Discharge: HOME | End: 2022-07-24
Attending: EMERGENCY MEDICINE | Admitting: EMERGENCY MEDICINE
Payer: COMMERCIAL

## 2022-07-23 DIAGNOSIS — S80.212A ABRASION OF KNEE, BILATERAL: ICD-10-CM

## 2022-07-23 DIAGNOSIS — S52.124A CLOSED NONDISPLACED FRACTURE OF HEAD OF RIGHT RADIUS, INITIAL ENCOUNTER: ICD-10-CM

## 2022-07-23 DIAGNOSIS — S80.211A ABRASION OF KNEE, BILATERAL: ICD-10-CM

## 2022-07-23 PROCEDURE — 73110 X-RAY EXAM OF WRIST: CPT | Mod: RT

## 2022-07-23 PROCEDURE — 250N000013 HC RX MED GY IP 250 OP 250 PS 637: Performed by: EMERGENCY MEDICINE

## 2022-07-23 PROCEDURE — 99284 EMERGENCY DEPT VISIT MOD MDM: CPT | Mod: 25

## 2022-07-23 PROCEDURE — 73070 X-RAY EXAM OF ELBOW: CPT | Mod: RT

## 2022-07-23 PROCEDURE — 24650 CLTX RDL HEAD/NCK FX WO MNPJ: CPT | Mod: RT

## 2022-07-23 PROCEDURE — 73130 X-RAY EXAM OF HAND: CPT | Mod: RT

## 2022-07-23 PROCEDURE — 73090 X-RAY EXAM OF FOREARM: CPT | Mod: RT

## 2022-07-23 RX ORDER — IBUPROFEN 600 MG/1
600 TABLET, FILM COATED ORAL ONCE
Status: COMPLETED | OUTPATIENT
Start: 2022-07-23 | End: 2022-07-23

## 2022-07-23 RX ORDER — OXYCODONE HYDROCHLORIDE 5 MG/1
5 TABLET ORAL ONCE
Status: COMPLETED | OUTPATIENT
Start: 2022-07-23 | End: 2022-07-23

## 2022-07-23 RX ADMIN — OXYCODONE HYDROCHLORIDE 5 MG: 5 TABLET ORAL at 23:26

## 2022-07-23 RX ADMIN — IBUPROFEN 600 MG: 600 TABLET ORAL at 23:26

## 2022-07-23 ASSESSMENT — ENCOUNTER SYMPTOMS: WOUND: 1

## 2022-07-24 VITALS
OXYGEN SATURATION: 97 % | RESPIRATION RATE: 16 BRPM | SYSTOLIC BLOOD PRESSURE: 175 MMHG | HEART RATE: 90 BPM | DIASTOLIC BLOOD PRESSURE: 105 MMHG | TEMPERATURE: 98.1 F

## 2022-07-24 RX ORDER — HYDROCODONE BITARTRATE AND ACETAMINOPHEN 5; 325 MG/1; MG/1
1 TABLET ORAL EVERY 6 HOURS PRN
Qty: 15 TABLET | Refills: 0 | Status: SHIPPED | OUTPATIENT
Start: 2022-07-24 | End: 2022-07-28

## 2022-07-24 RX ORDER — IBUPROFEN 600 MG/1
600 TABLET, FILM COATED ORAL EVERY 6 HOURS PRN
Qty: 30 TABLET | Refills: 0 | Status: SHIPPED | OUTPATIENT
Start: 2022-07-24

## 2022-07-24 ASSESSMENT — ENCOUNTER SYMPTOMS: NUMBNESS: 0

## 2022-07-24 NOTE — DISCHARGE INSTRUCTIONS
Diagnosis: Right radial head fracture, knee abrasions  Plan: Sling on for pain/comfort.   Norco/ibuprofen as needed for pain.   Also keep elevated in bed, apply ice several times/day.   Follow-up with orthopedics for repeat x-ray in 1 week - sooner if significant/uncontrollable pain - they would consider placing splint/cast if pain uncontrollable, however these fractures often heal well with sling only.     Return Precautions:   Uncontrollable pain, numbness/tingling in hand, fever > 100.4, or for any other concerns.     Please read the remainder of your discharge instructions for more information.

## 2022-07-24 NOTE — ED PROVIDER NOTES
History   Chief Complaint:  Wrist Pain       HPI   Lidya Montelongo is a 42 year old female with no significant PMH who presents with her brother for evaluation of wrist pain. The patient reports that she tripped over a hose while walking and fell down on her driveway landing on her hands. She has some abrasions to her knees but her main concern is right hand and wrist pain that radiates up to her elbow. She did not take anything for the pain.    Review of Systems   Musculoskeletal:        Right wrist, hand forearm pain   Skin: Positive for wound (Abrasions to knees and right elbow).   Neurological: Negative for numbness.   All other systems reviewed and are negative.    Allergies:  Amoxicillin  Latex  Magnesium    Medications:  Celexa  Atarax  Inderal  Requip    Past Medical History:     Morbid obesity   Major depressive disorder  Migraine with aura  RLS  Anxiety   Subclinical hypothyroidism    Asthma   Obesity   ADHD  GERD    Past Surgical History:      Tubal ligation     Family History:    Mother: hypertension, type II diabetes  Father: hypertension, hyperlipidemia, MI    Social History:  The patient presents to the ED with her brother  PCP: Veda Rust MD    Physical Exam     Patient Vitals for the past 24 hrs:   Temp Temp src Pulse Resp SpO2   22 2223 98.1  F (36.7  C) Oral 89 20 98 %       Physical Exam  Head:  The scalp, face, and head appear normal  Eyes:  Conjunctivae are normal  ENT:    The nose is normal    Pinnae are normal  Neck:  Trachea midline  CV:  Normal rate, regular rhythm. Radial pulses normal.   Resp:  No respiratory distress   Musc:  Normal muscular tone    Bilateral knee abrasions. Pt ambulatory.    Pt holding right elbow flexed at 90 degrees.    Pain to palpation over medial and lateral epicondyles. Pain with pronation/supination. Pain to palpation wrist and dorsal/lateral hand.   Skin:  No rash or lesions noted  Neuro: Speech is normal and fluent. Face is  symmetric. Moving all extremities well. Pt able to wiggle all fingers. Normal sensation median, ulnar, and radial nerve distributions.   Psych:  Awake. Alert.  Normal affect.  Appropriate interactions.      Emergency Department Course   ECG  No results found for this or any previous visit.    Imaging:  Elbow XR, 2 views, right   Final Result   IMPRESSION: Subtle irregularity of radial head consistent with nondisplaced fracture. Bones otherwise appear normal. Large joint effusion present.      Radius/Ulna XR, PA & LAT, right   Final Result   IMPRESSION: Nondisplaced fracture involving radial head with elbow joint effusion. Remainder of radius and ulna are negative.      XR Hand Right G/E 3 Views   Final Result   IMPRESSION:       No evidence of acute fracture or dislocation. Joint spaces are preserved. Soft tissues grossly unremarkable.      If there is clinical concern for occult scaphoid fracture, consider repeat radiographs in 10-14 days.      XR Wrist Right G/E 3 Views   Final Result   IMPRESSION:       No evidence of acute fracture or dislocation. Joint spaces are preserved. Soft tissues grossly unremarkable.      If there is clinical concern for occult scaphoid fracture, consider repeat radiographs in 10-14 days.        Report per radiology    Emergency Department Course:             Reviewed:  I reviewed nursing notes, vitals, past medical history and Care Everywhere    Assessments:  2323 I obtained history and examined the patient as noted above.   0019 I rechecked the patient and explained findings.     Interventions:  2326 Ibuprofen, 600 mg, Oral  2326 oxycodone, 5 mg, PO    Disposition:  The patient was discharged to home.     Impression & Plan   Medical Decision Making:  Patient presents with right elbow and wrist pain following a mechanical fall.  She also has abrasions to her knees.  She reports her tetanus is up-to-date.  Imaging performed by triage nursing of the wrist and hand negative.  Upon my  exam, patient having more elbow than wrist tenderness.  Elbow x-ray shows effusion and likely radial head fracture.  Patient neurovascularly intact.  Discussed that this type of fracture is nonoperative, and does well with sling only and early range of motion.  Patient therefore placed in a sling here.  Recommended orthopedic follow-up in about 1 week.  Pain medication prescribed after discussion of risks and benefits.  No other traumatic injuries noted.  She is discharged in stable condition.  All questions answered.    Diagnosis:    ICD-10-CM    1. Closed nondisplaced fracture of head of right radius, initial encounter  S52.124A    2. Abrasion of knee, bilateral  S80.211A     S80.212A        Discharge Medications:  Discharge Medication List as of 7/24/2022 12:44 AM      START taking these medications    Details   HYDROcodone-acetaminophen (NORCO) 5-325 MG tablet Take 1 tablet by mouth every 6 hours as needed for severe pain, Disp-15 tablet, R-0, E-Prescribe      ibuprofen (ADVIL/MOTRIN) 600 MG tablet Take 1 tablet (600 mg) by mouth every 6 hours as needed for moderate pain, Disp-30 tablet, R-0, E-Prescribe             Scribe Disclosure:  I, Gurwinder Jay, am serving as a scribe at 10:49 PM on 7/23/2022 to document services personally performed by Nima Foy MD based on my observations and the provider's statements to me.            Nima Foy MD  07/24/22 0646

## 2022-07-24 NOTE — ED TRIAGE NOTES
Pt states injured right wrist/hand tonight after mechanical fall over garden hose. CMS intact GCS 15     Triage Assessment     Row Name 07/23/22 2223       Triage Assessment (Adult)    Airway WDL WDL       Respiratory WDL    Respiratory WDL WDL       Skin Circulation/Temperature WDL    Skin Circulation/Temperature WDL WDL       Cardiac WDL    Cardiac WDL WDL       Peripheral/Neurovascular WDL    Peripheral Neurovascular WDL WDL       Cognitive/Neuro/Behavioral WDL    Cognitive/Neuro/Behavioral WDL WDL

## 2022-08-18 ENCOUNTER — TRANSFERRED RECORDS (OUTPATIENT)
Dept: INTERNAL MEDICINE | Facility: CLINIC | Age: 42
End: 2022-08-18

## 2022-09-03 ENCOUNTER — HEALTH MAINTENANCE LETTER (OUTPATIENT)
Age: 42
End: 2022-09-03

## 2022-10-07 ENCOUNTER — OFFICE VISIT (OUTPATIENT)
Dept: URGENT CARE | Facility: URGENT CARE | Age: 42
End: 2022-10-07
Payer: COMMERCIAL

## 2022-10-07 VITALS
DIASTOLIC BLOOD PRESSURE: 97 MMHG | SYSTOLIC BLOOD PRESSURE: 168 MMHG | OXYGEN SATURATION: 100 % | HEART RATE: 83 BPM | TEMPERATURE: 98.9 F

## 2022-10-07 DIAGNOSIS — J45.21 MILD INTERMITTENT ASTHMA WITH ACUTE EXACERBATION: Primary | ICD-10-CM

## 2022-10-07 DIAGNOSIS — J06.9 VIRAL URI WITH COUGH: ICD-10-CM

## 2022-10-07 DIAGNOSIS — R03.0 ELEVATED BP WITHOUT DIAGNOSIS OF HYPERTENSION: ICD-10-CM

## 2022-10-07 PROCEDURE — 99214 OFFICE O/P EST MOD 30 MIN: CPT | Performed by: PHYSICIAN ASSISTANT

## 2022-10-07 RX ORDER — BENZONATATE 200 MG/1
200 CAPSULE ORAL 3 TIMES DAILY PRN
Qty: 30 CAPSULE | Refills: 0 | Status: SHIPPED | OUTPATIENT
Start: 2022-10-07

## 2022-10-07 RX ORDER — PREDNISONE 20 MG/1
40 TABLET ORAL DAILY
Qty: 10 TABLET | Refills: 0 | Status: SHIPPED | OUTPATIENT
Start: 2022-10-07 | End: 2022-10-12

## 2022-10-07 ASSESSMENT — ENCOUNTER SYMPTOMS
FEVER: 0
RHINORRHEA: 1
WHEEZING: 1
COUGH: 1

## 2022-10-08 DIAGNOSIS — F32.0 MILD MAJOR DEPRESSION (H): ICD-10-CM

## 2022-10-08 NOTE — PROGRESS NOTES
Assessment & Plan:        ICD-10-CM    1. Mild intermittent asthma with acute exacerbation  J45.21 predniSONE (DELTASONE) 20 MG tablet   2. Viral URI with cough  J06.9 benzonatate (TESSALON) 200 MG capsule   3. Elevated BP without diagnosis of hypertension  R03.0          Plan/Clinical Decision Makin) Asthma/URI    Patient with URI x 8 days with exacerbation of asthma.   Will start course of prednisone. Reviewed side effects.   Can continue with nebs and inhaler.   Can take Tessalon for cough as needed.     2) Elevated BP.   Patient with normal BP last year, elevated here in UC today and at ED this summer.   Patient has visit with PCP in two weeks.  She is able to monitor BP at home and can follow up with PCP.     Return if symptoms worsen or fail to improve 2-3 days.     At the end of the encounter, I discussed results, diagnosis, medications. Discussed red flags for immediate return to clinic/ER, as well as indications for follow up if no improvement. Patient understood and agreed to plan. Patient was stable for discharge.        Zunilda Nagel PA-C on 10/7/2022 at 7:39 PM          Subjective:     HPI:    Lidya is a 42 year old female who presents to clinic today for the following health issues:  Chief Complaint   Patient presents with     Urgent Care     Congestion,cough and right ear pressure/pain which started  and pt took Covid Ubaldo 10/02/22 and it was negative. Pt has asthma history.     HPI    Patient complains of cough, congestion since 22.  Symptoms now for 8 days.   Patient taking sudafed, Mucinex and inhaler.    Patient having some right ear pain.   Wheezing improving and responding well to inhaler.   Using neb twice a day.   Cough seems to be persisting and not improving. Waking her up at night.          History obtained from the patient.    Review of Systems   Constitutional: Negative for fever.   HENT: Positive for congestion, postnasal drip and rhinorrhea.     Respiratory: Positive for cough and wheezing.        Patient Active Problem List   Diagnosis     Mild intermittent asthma with acute exacerbation     Morbid obesity (H)     MDD (major depressive disorder)     Migraine with aura     Dermatographia     Restless legs syndrome (RLS)     RIGO (generalized anxiety disorder)     Subclinical hypothyroidism     Elevated BP without diagnosis of hypertension        Past Medical History:   Diagnosis Date     Dermatographia      RIGO (generalized anxiety disorder)      MDD (major depressive disorder)      Migraine with aura      Morbid obesity (H)      Restless legs syndrome (RLS)      Subclinical hypothyroidism        Social History     Tobacco Use     Smoking status: Never Smoker     Smokeless tobacco: Never Used   Substance Use Topics     Alcohol use: No             Objective:     Vitals:    10/07/22 1914 10/07/22 2026   BP: (!) 187/106 (!) 168/97   BP Location: Right arm Left arm   Patient Position: Sitting Sitting   Cuff Size: Adult Large Adult Large   Pulse: 83    Temp: 98.9  F (37.2  C)    TempSrc: Tympanic    SpO2: 100%          Physical Exam   EXAM:   Pleasant, alert, appropriate appearance. NAD.  Head Exam: Normocephalic, atraumatic.  Eye Exam:  non icteric/injection.    Ear Exam: TMs grey without bulging. Normal canals.  Normal pinna.  Nose Exam: Normal external nose.    OroPharynx Exam:  Moist mucous membranes. No erythema, pharynx without exudate or hypertrophy.  Neck/Thyroid Exam:  No LAD.    Chest/Respiratory Exam: CTAB.  Cardiovascular Exam: RRR. No murmur or rubs.      Results:  No results found for any visits on 10/07/22.

## 2022-10-10 DIAGNOSIS — G25.81 RESTLESS LEGS SYNDROME (RLS): ICD-10-CM

## 2022-10-10 RX ORDER — ROPINIROLE 1 MG/1
1 TABLET, FILM COATED ORAL AT BEDTIME
Qty: 90 TABLET | Refills: 1 | Status: CANCELLED | OUTPATIENT
Start: 2022-10-10

## 2022-10-10 NOTE — TELEPHONE ENCOUNTER
Medication Question or Refill    Contacts       Type Contact Phone/Fax    10/10/2022 10:26 AM CDT Phone (Incoming) Lidya Montelongo (Self) [phone] (M)          What medication are you calling about (include dose and sig)?: rOPINIRole (REQUIP) 1 MG tablet    Controlled Substance Agreement on file:   CSA -- Patient Level:    CSA: None found at the patient level.       Who prescribed the medication?:     Do you need a refill? Yes: pt has been out 2 weeks     When did you use the medication last? 2 weeks ago     Patient offered an appointment? Yes: pt declined     Do you have any questions or concerns?  No    Preferred Pharmacy:   [facility] [address] [place] [id number] [facility]  [id number] [place] [id number]  Phone: [phone] Fax: [phone]      Could we send this information to you in [facility] or would you prefer to receive a phone call?:   No preference   Okay to leave a detailed message?: Yes at Home number on file [phone] (home)

## 2022-10-10 NOTE — TELEPHONE ENCOUNTER
Script sent to pharmacy 10/7/22    Patient Contact    Attempt # 1    Was call answered?  No.  Left detailed message on voicemail with information to call me back if any questions.

## 2022-10-11 NOTE — TELEPHONE ENCOUNTER
Patient Contact    Attempt # 2    Was call answered?  No.  Left message on voicemail with information to call me back. and Unable to leave message.    Patient requesting multiple medications. Refils sent on 10/7/22 to [facility] in [employer] message also sent to patient under the 10/6/22 encounter.

## 2022-10-11 NOTE — TELEPHONE ENCOUNTER
citalopram (CELEXA) 40 MG tablet 90 tablet 1 10/7/2022  No   Sig - Route: Take 1 tablet (40 mg) by mouth daily - Oral   Sent to pharmacy as: Citalopram Hydrobromide 40 MG Oral Tablet (celeXA)   Class: E-Prescribe   Order: [id number]   E-Prescribing Status: Receipt confirmed by pharmacy (10/7/2022 10:43 AM CDT)       [id number] [place] [id number]       I see different [facility]'s pharmacy for Celexa. Signed 10/7/22 but in [place]. However [facility] to patient within their own system. Patient has refills.    TEAM please call patient and find out what is primary pharmacy.    Thank you,    Juani Rowe RN  [facility]

## 2022-10-11 NOTE — TELEPHONE ENCOUNTER
Patient Contact    Attempt # 2    Was call answered?  No.  Left message on voicemail with information to call me back.    Lorena Gaxiola message was also sent. See encounter from 10/6/2022    Kathe Gage RN

## 2022-10-12 RX ORDER — CITALOPRAM HYDROBROMIDE 40 MG/1
TABLET ORAL
Qty: 90 TABLET | Refills: 0 | OUTPATIENT
Start: 2022-10-12

## 2022-10-12 NOTE — TELEPHONE ENCOUNTER
Patient Contact    Attempt # 3    Was call answered?  No.  Left message on voicemail with information to call me back.    Upon chart review. Scripts sent on 10/7/22 to [id number] in [employer].     Kathe Gage RN

## 2022-10-12 NOTE — TELEPHONE ENCOUNTER
Patient Contact    Attempt # 3    Was call answered?  No.  Left message on voicemail with information to call me back.      Upon chart review, scripts sent to [facility] on 10/7/22 and per dispense report, medications requested were picked up. Unable to verify with patient if they wanted a request sent to Health system.    Routing to PCP for review.    Kathe Gage RN

## 2023-05-08 DIAGNOSIS — G43.509 PERSISTENT MIGRAINE AURA WITHOUT CEREBRAL INFARCTION AND WITHOUT STATUS MIGRAINOSUS, NOT INTRACTABLE: ICD-10-CM

## 2023-05-08 DIAGNOSIS — G25.81 RESTLESS LEGS SYNDROME (RLS): ICD-10-CM

## 2023-05-08 DIAGNOSIS — F32.0 MILD MAJOR DEPRESSION (H): ICD-10-CM

## 2023-05-08 RX ORDER — ROPINIROLE 1 MG/1
1 TABLET, FILM COATED ORAL AT BEDTIME
Qty: 90 TABLET | Refills: 1 | OUTPATIENT
Start: 2023-05-08

## 2023-05-08 RX ORDER — CITALOPRAM HYDROBROMIDE 40 MG/1
40 TABLET ORAL DAILY
Qty: 90 TABLET | Refills: 1 | OUTPATIENT
Start: 2023-05-08

## 2023-05-08 RX ORDER — PROPRANOLOL HYDROCHLORIDE 20 MG/1
20 TABLET ORAL 2 TIMES DAILY
Qty: 180 TABLET | Refills: 1 | OUTPATIENT
Start: 2023-05-08

## 2023-05-08 NOTE — LETTER
May 19, 2023          Lidya Montelongo  [address] [place] [address]          I am contacting you regarding the refill request we received for you. After reviewing your chart it looks like you are overdue for your annual and for a med check. Please call [phone] to  schedule this to continue to receive refills. If you anticipate running out before your appointment let us know and we can send in a nickie refill.       Let them know there is a note in your chart with times to be worked in to be seen.         Thank you,     [facility] nursing staff

## 2023-05-08 NOTE — TELEPHONE ENCOUNTER
She is overdue for her annual exam. Please ask her to schedule this appointment ASAP. Can refill medication temporarily if she anticipates running out prior to scheduled appointment.     Thank you.     ---    May use any virtual or virtual release spot for an in-person visit.     Patient may also be seen at noon (arrival time 11:40am) Mondays, Wednesdays, Thursdays, and Fridays OR at 1:00pm (arrival time 12:40pm) on Thursdays (during the huddle).    Please do not schedule in a same day, next day, or hospital follow-up slot.    Okay to double book lunch slot (but patient should still arrive by 11:40am).

## 2023-05-23 DIAGNOSIS — G25.81 RESTLESS LEGS SYNDROME (RLS): ICD-10-CM

## 2023-05-23 RX ORDER — ROPINIROLE 1 MG/1
1 TABLET, FILM COATED ORAL AT BEDTIME
Qty: 90 TABLET | Refills: 1 | OUTPATIENT
Start: 2023-05-23

## 2023-05-23 NOTE — LETTER
[facility] [address] [address]  [phone]  May 26, 2023  Lidya Montelongo  [address] [place] [address] [place] [address]    Dear Lidya,      I am contacting you regarding the refill request we received for you. After reviewing your chart it looks like you are overdue for your annual and for a med check. Please call [phone] to  schedule this to continue to receive refills. If you anticipate running out before your appointment let us know and we can send in a nickie refill.       Let them know there is a note in your chart with times to be worked in to be seen.         Thank you,     [facility] nursing staff

## 2023-07-22 ENCOUNTER — HEALTH MAINTENANCE LETTER (OUTPATIENT)
Age: 43
End: 2023-07-22

## 2024-02-17 ENCOUNTER — HEALTH MAINTENANCE LETTER (OUTPATIENT)
Age: 44
End: 2024-02-17

## 2024-09-14 ENCOUNTER — HEALTH MAINTENANCE LETTER (OUTPATIENT)
Age: 44
End: 2024-09-14